# Patient Record
Sex: FEMALE | Race: BLACK OR AFRICAN AMERICAN
[De-identification: names, ages, dates, MRNs, and addresses within clinical notes are randomized per-mention and may not be internally consistent; named-entity substitution may affect disease eponyms.]

---

## 2020-03-14 ENCOUNTER — HOSPITAL ENCOUNTER (OUTPATIENT)
Dept: HOSPITAL 25 - ED | Age: 50
Setting detail: OBSERVATION
LOS: 1 days | Discharge: HOME | End: 2020-03-15
Attending: INTERNAL MEDICINE | Admitting: INTERNAL MEDICINE
Payer: SELF-PAY

## 2020-03-14 DIAGNOSIS — R06.00: ICD-10-CM

## 2020-03-14 DIAGNOSIS — Z87.891: ICD-10-CM

## 2020-03-14 DIAGNOSIS — Z79.3: ICD-10-CM

## 2020-03-14 DIAGNOSIS — D25.9: ICD-10-CM

## 2020-03-14 DIAGNOSIS — D50.9: Primary | ICD-10-CM

## 2020-03-14 DIAGNOSIS — J45.909: ICD-10-CM

## 2020-03-14 DIAGNOSIS — R94.31: ICD-10-CM

## 2020-03-14 LAB
ALBUMIN SERPL BCG-MCNC: 4.2 G/DL (ref 3.2–5.2)
ALBUMIN/GLOB SERPL: 1.4 {RATIO} (ref 1–3)
ALP SERPL-CCNC: 49 U/L (ref 34–104)
ALT SERPL W P-5'-P-CCNC: 8 U/L (ref 7–52)
ANION GAP SERPL CALC-SCNC: 7 MMOL/L (ref 2–11)
AST SERPL-CCNC: 14 U/L (ref 13–39)
BASOPHILS # BLD AUTO: 0.1 10^3/UL (ref 0–0.2)
BUN SERPL-MCNC: 7 MG/DL (ref 6–24)
BUN/CREAT SERPL: 12.3 (ref 8–20)
CALCIUM SERPL-MCNC: 9.2 MG/DL (ref 8.6–10.3)
CHLORIDE SERPL-SCNC: 105 MMOL/L (ref 101–111)
EOSINOPHIL # BLD AUTO: 0 10^3/UL (ref 0–0.6)
FERRITIN SERPL IA-MCNC: 2.5 NG/ML (ref 11–307)
GLOBULIN SER CALC-MCNC: 3 G/DL (ref 2–4)
GLUCOSE SERPL-MCNC: 74 MG/DL (ref 70–100)
HCO3 SERPL-SCNC: 25 MMOL/L (ref 22–32)
HCT VFR BLD AUTO: 21 % (ref 35–47)
HCT VFR BLD AUTO: 23 % (ref 35–47)
HGB BLD-MCNC: 6.1 G/DL (ref 12–16)
INR PPP/BLD: 1.22 (ref 0.82–1.09)
IRON SERPL-MCNC: < 20 UG/DL (ref 50–212)
LYMPHOCYTES # BLD AUTO: 1.4 10^3/UL (ref 1–4.8)
MCH RBC QN AUTO: 14 PG (ref 27–31)
MCHC RBC AUTO-ENTMCNC: 27 G/DL (ref 31–36)
MCV RBC AUTO: 52 FL (ref 80–97)
MICROCYTES BLD QL SMEAR: (no result)
MONOCYTES # BLD AUTO: 0.4 10^3/UL (ref 0–0.8)
NEUTROPHILS # BLD AUTO: 1.4 10^3/UL (ref 1.5–7.7)
NRBC # BLD AUTO: 0 10^3/UL
NRBC BLD QL AUTO: 0.4
PLATELET # BLD AUTO: 248 10^3/UL (ref 150–450)
POTASSIUM SERPL-SCNC: 4 MMOL/L (ref 3.5–5)
PROT SERPL-MCNC: 7.2 G/DL (ref 6.4–8.9)
RBC # BLD AUTO: 4.14 10^6/UL (ref 3.7–4.87)
RBC # BLD AUTO: 4.36 10^6 /UL (ref 3.7–4.87)
RETICULOCYTE PRODUCTION INDEX: 0.3 %
RETICULOCYTE PRODUCTION INDEX: 0.5 % (ref 0.5–1.5)
SODIUM SERPL-SCNC: 137 MMOL/L (ref 135–145)
TIBC SERPL-MCNC: 564 MCG/DL (ref 250–450)
TRANSFERRIN SERPL-MCNC: 403 MG/DL (ref 203–362)
TROPONIN I SERPL-MCNC: 0 NG/ML (ref ?–0.03)
WBC # BLD AUTO: 3.4 10^3/UL (ref 3.5–10.8)

## 2020-03-14 PROCEDURE — 86900 BLOOD TYPING SEROLOGIC ABO: CPT

## 2020-03-14 PROCEDURE — 36415 COLL VENOUS BLD VENIPUNCTURE: CPT

## 2020-03-14 PROCEDURE — 36430 TRANSFUSION BLD/BLD COMPNT: CPT

## 2020-03-14 PROCEDURE — 84484 ASSAY OF TROPONIN QUANT: CPT

## 2020-03-14 PROCEDURE — 83540 ASSAY OF IRON: CPT

## 2020-03-14 PROCEDURE — 83550 IRON BINDING TEST: CPT

## 2020-03-14 PROCEDURE — P9040 RBC LEUKOREDUCED IRRADIATED: HCPCS

## 2020-03-14 PROCEDURE — 76856 US EXAM PELVIC COMPLETE: CPT

## 2020-03-14 PROCEDURE — 80053 COMPREHEN METABOLIC PANEL: CPT

## 2020-03-14 PROCEDURE — 99283 EMERGENCY DEPT VISIT LOW MDM: CPT

## 2020-03-14 PROCEDURE — 86850 RBC ANTIBODY SCREEN: CPT

## 2020-03-14 PROCEDURE — 86922 COMPATIBILITY TEST ANTIGLOB: CPT

## 2020-03-14 PROCEDURE — 86901 BLOOD TYPING SEROLOGIC RH(D): CPT

## 2020-03-14 PROCEDURE — G0378 HOSPITAL OBSERVATION PER HR: HCPCS

## 2020-03-14 PROCEDURE — 85610 PROTHROMBIN TIME: CPT

## 2020-03-14 PROCEDURE — 85045 AUTOMATED RETICULOCYTE COUNT: CPT

## 2020-03-14 PROCEDURE — 82728 ASSAY OF FERRITIN: CPT

## 2020-03-14 PROCEDURE — 85025 COMPLETE CBC W/AUTO DIFF WBC: CPT

## 2020-03-14 PROCEDURE — 93005 ELECTROCARDIOGRAM TRACING: CPT

## 2020-03-14 NOTE — HP
CC:  Dr. Zonia Hess; Dr. Richards *

 

ADMISSION HISTORY AND PHYSICAL:

 

ATE OF ADMISSION:  03/14/20

 

PRIMARY CARE PHYSICIAN:  Dr. Zonia Hess.

 

SOURCE OF INFORMATION:  History obtained from interview with the patient.

 

RELIABILITY:  Good.

 

HEALTHCARE PROXY:  Her son.

 

CODE STATUS:  Full.

 

CHIEF COMPLAINT:  Dyspnea on exertion and sent in for anemia.

 

HISTORY OF PRESENT ILLNESS:  This is a 49-year-old female with minimal past 
medical history including reactive airway disease, who has been in her usual 
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 
she works with housekeeping, she is becoming increasingly dyspneic with 
exertion.  Yesterday, she was referred to have blood work because of her 
worsening dyspnea on exertion, which I believe the referral came from Dr. Richards and basic lab work was notable for a hemoglobin of 5.7.  She was 
referred to the emergency room and repeat was 6.1, severely microcytic with an 
MCV of 52.  She notes additionally with her dyspnea on exertion she 
occasionally has chest discomfort described as a sharp pain in her left breast 
while working.  She has a history of heavy menses for the last 3 years, worse 
over the last year.  Typically, her menses last for about 3 days and she goes 
through approximately 5 pads per day.  She denies any bright red blood per 
rectum.  She has no melena.  Yesterday, she had some nausea, but no vomiting.  
She has otherwise felt well.

 

PAST MEDICAL HISTORY:  Includes reactive airway disease, on no medication.

 

PAST SURGICAL HISTORY:  History of breast reduction.

 

MEDICATIONS:  She is on no medications.

 

ALLERGIES:  She has no allergies.

 

FAMILY HISTORY:  Sister with CAD.  No history of bleeding in her family, 
although she believes her sister may have had sickle cell disease.

 

SOCIAL HISTORY:  Previously smoked cigars for about 5 years, quit 6 years 
prior. Previously drank more heavily 6-pack per day, but has not drank in 6 
years.  She works at [employer] in housekeeping.  She was born in [facility], had 
previously been lived in [place], and has been in [place] over the last 2 
years.

 

REVIEW OF SYSTEMS:  A 14-point review of systems is negative for all systems.

 

                               PHYSICAL EXAMINATION

 

GENERAL:  A well-appearing female, in no apparent distress.

 

VITAL SIGNS:  In the emergency room, 116/71, heart rate 64, respiratory rate of 
16, she is 98% on room air, T-max 98.5.

 

HEENT:  Her oropharynx is clear.  She has moist mucous membranes.  No scleral 
icterus, although mild conjunctival pallor.

 

LUNGS:  Clear to auscultation.

 

HEART:  She has a regular rate and rhythm with no murmurs, rubs, or gallops.

 

ABDOMEN:  Soft, nontender, nondistended.

 

EXTREMITIES:  Warm and well perfused.  Less than 2 second cap refill.  No 
clubbing, cyanosis, or edema.

 

NEUROLOGIC:  She is alert and oriented x3.  Her cranial nerves II through XII 
are intact.

 

 DIAGNOSTIC STUDIES/LAB DATA:  Pertinent labs:  BMP is normal.  Troponin I is 
0.00. White blood cell count is 3.4, hemoglobin is 6.1 with an MCV of 52, 
hematocrit of 23, platelets 248, 2+ hypochromasia, 1+ anisocytosis, and 2+ 
microcytosis.  Her INR is 1.2.

 

EKG:  Normal sinus rhythm with normal limit axis, normal intervals, normal R-
wave progression.  No ST or T-wave changes.

 

ASSESSMENT AND PLAN:  This is a 49-year-old female with newly identified severe 
microcytic anemia in the setting of progressive dyspnea on exertion.

1.  Severe anemia.  Suspect iron-deficiency anemia in the setting of many years 
of heavy menses.  Checking iron studies as well as reticulocyte count prior to 
the administration of blood now.  Transfuse 1 unit of packed red blood cells, 
check after transfusion in the morning.  Plan on iron replacement if iron 
deficient. Additionally, if no evidence of bleeding from any other source, of 
which at this point there is no indication that she has another source, we 
would start oral contraceptive pill on discharge to assist with heavy menstrual 
bleeding. Additionally, we will check transvaginal ultrasound to evaluate for 
fibroids contributing to heavy bleeding.

2.  Chest discomfort.  She has had typical angina in the course of her work; 
however, particularly in the setting of severe anemia, I do think she will 
warrant outpatient stress test on a nonemergent basis, which could be ordered 
prior to her discharge.

3.  Bronchial reactive airway disease, on no medications and stable.

4.  DVT prophylaxis:  Low risk as well as anemic.  We will place with HONG 
stockings, ambulate ad nighat.

 

 

 

[id number] #: [id number]

[facility]

## 2020-03-14 NOTE — XMS REPORT
Continuity of Care Document (CCD)

 Created on:2020



Patient:Gin Canchola

Sex:Female

:1970

External Reference #:[id number]





Demographics







 Address  [address]



   [place] [address]

 

 Mobile Phone  [phone]

 

 Email Address  [email or web address]

 

 Preferred Language  en

 

 Marital Status  Not  or 

 

 Yazdanism Affiliation  Unknown

 

 Race  White

 

 Ethnic Group  Not  or 









Author







 Name  Zonia Hess MD

 

 Address  [address]



   Unavailable



   [place] [address]









Care Team Providers







 Name  Role  Phone

 

 Zonia Hess MD - Internal Medicine  Care Team Information   [phone]
[phone]









Problems







 Description

 

 No Information Available







Social History







 Type  Date  Description  Comments

 

 Birth Sex    Unknown  

 

 ETOH Use    Rarely consumes alcohol  

 

 Tobacco Use  Start: Unknown End:  Patient is a former  Smoked 5 cigars



   Unknown  smoker  daily from [age]



       [age]

 

 Recreational Drug Use    Denies Drug Use  

 

 Smoking Status  Reviewed: 20  Patient is a former  Smoked 5 cigars



     smoker  daily from [age]



       [age]

 

 Exercise Type/Frequency    Does not exercise  







Allergies, Adverse Reactions, Alerts







 Description

 

 No Known Drug Allergies







Medications







 Active Medications  SIG  Qnty  Indications  Ordering Provider  Date

 

 Asmanex HFA  1 puff twice a  2units    Tonya  2020



   day      KENDY Varner  



 100mcg/Act Aerosol          



           

 

 Albuterol Sulfate  use 1-2 puffs  18Inhaler    Eri Richards,  2020



 HFA  every 4-6 hours      MD  



    108(90Base)  as needed        



 mcg/Act Aerosol          



           









 History Medications









 Flovent HFA  1 puff twice daily  1units  J45.909  Tonya  2020 -



         Telly, NP  2020



 110mcg/Act          



 Aerosol          



           

 

 Ventolin HFA  1 to 2 inhalations  8gm  J45.909  Tonya  2020 -



   every 4 to 6 hours      KENDY Varner  2020



 108(90Base)  as needed        



 mcg/Act Aerosol          



           

 

 Flovent HFA  Inhale 1 puff By  12Inhaler    Tonya  2020 -



   Mouth Twice A Day      KENDY Varner  2020



 110mcg/Act          



 Aerosol          



           







Immunizations







 CPT Code  Status  Date  Vaccine  Reaction  Lot #

 

 [id number]  Given  2019  Tdap -  No immediate reaction  525np



       Tetanus/Diptheria/Acellular    



       Pertussis    







Vital Signs







 Date  Vital  Result  Comment

 

 2020  2:49pm  Height  63.5 inches  5'3.50"









 Weight  155.00 lb  

 

 Heart Rate  66 /min  

 

 BP Systolic Sitting  118 mmHg  

 

 BP Diastolic Sitting  65 mmHg  

 

 Body Temperature  97.8 F  

 

 O2 % BldC Oximetry  99 %  

 

 BMI (Body Mass Index)  27.0 kg/m2  









 2020  8:11am  Height  63.5 inches  5'3.50"









 Weight  135.00 lb  

 

 Heart Rate  72 /min  

 

 BP Systolic  110 mmHg  

 

 BP Diastolic  70 mmHg  

 

 O2 % BldC Oximetry  98 %  

 

 BMI (Body Mass Index)  23.5 kg/m2  







Results







 Description

 

 No Information Available







Procedures







 Date  Code  Description  Status

 

 2020  13146  EKG Tracing & Interpretation  Completed

 

 2020  25632  Bronchospasm Provocation Evalu  Completed

 

 2020  18533  Diffusing Capacity  Completed

 

 2020  69907  Plethysmography Determination Lung Volumes & Per Airway  
Completed



     Resist  

 

 2020  98957  Pulmonary Function><Bronchodil  Completed

 

 2019  14239  ECHO Stress Test Incl Perf Contiuous ekg Monitoring  
Completed



     W/Phys Superv  

 

 2019  53013842  Mammogram  Completed







Medical Devices







 Description

 

 No Information Available







Encounters







 Type  Date  Location  Provider  Dx  Diagnosis

 

 Office Visit  2020  [facility]  Tonya  J45.909  Unspecified 
asthma,



   8:00a  [facility]  KENDY Varner    uncomplicated



     Cma      









 I27.20  Pulmonary hypertension, unspecified

 

 R06.83  Snoring









 Office Visit  2019  9:15a  [facility]  Eri  R06.02  Shortness of



     [facility]  MD Maurice    breath



     Cma      









 R06.83  Snoring

 

 I27.20  Pulmonary hypertension, unspecified

 

 K21.9  Gastro-esophageal reflux disease without esophagitis









 Office Visit  2019  3:40p  [facility]  Qutaybeh S.  R00.2  
Palpitations



       ALONDRA Estes    









 I34.0  Nonrheumatic mitral (valve) insufficiency

 

 I27.20  Pulmonary hypertension, unspecified







Assessments







 Date  Code  Description  Provider

 

 2020  R07.89  Other chest pain  Zonia Hess MD

 

 2020  J45.909  Unspecified asthma, uncomplicated  Tonya Varner, KENDY

 

 2020  I27.20  Pulmonary hypertension, unspecified  Tonya Varner NP

 

 2020  R06.83  Snoring  Tonya Varner, KENDY

 

 2020  J45.909  Unspecified asthma, uncomplicated  Eri Richards MD

 

 2020  R06.02  Shortness of breath  Eri Richards MD

 

 2019  R06.02  Shortness of breath  Eri Richards MD

 

 2019  R06.83  Snoring  Eri Richards MD

 

 2019  I27.20  Pulmonary hypertension, unspecified  Eri Richards MD

 

 2019  K21.9  Gastro-esophageal reflux disease  Eri Richards MD



     without esophagitis  

 

 2019  R00.2  Palpitations  Qutaybeh S. Maghaydah, M.D.

 

 2019  I34.0  Nonrheumatic mitral (valve)  Qutaybeh S. Maghaydah, M.D.



     insufficiency  

 

 2019  I27.20  Pulmonary hypertension, unspecified  Qutaybeh S. Maghaydah
, M.D.

 

 2019  R00.2  Palpitations  Qutaybeh S. Maghaydah, M.D.

 

 2019  R06.02  Shortness of breath  Qutaybeh S. Maghaydah, M.D.

 

 2019  R00.0  Tachycardia, unspecified Qutaybeh S. Maghaydah, M.D.







Plan of Treatment

Future Appointment(s):2020 10:00 am - Tonya Varner NP at 
[facility] - Zonia Hess, MDR07.89 Other 
chest painComments:We will do some blood work to see if this will help us 
figure out why you are having pain. Your EKG looks great! Continue to use your 
puffer when you have this pain for the next 2-3 weeks.



Functional Status







 Description

 

 No Information Available







Mental Status







 Description

 

 No Information Available







Referrals







 Refer to Dr  Reason for Referral  Status  Appt Date

 

 Eri Richards MD  sob, h/o smoking/ PHTN  Sent  2019









 [address]

 

 [address]

 

 [place] [address] [phone]

## 2020-03-14 NOTE — ED
HPI Chest Pain





- HPI Summary


HPI Summary: 


49 year old female presents to the ED with a chief complaint of chest pain 

starting yesterday. Patient saw her PCP after having chest pain yesterday, and 

blood panel showed that she was anemic and told her [place] [facility] Patient 

also reports slight shortness of breath. Patient has no PMHx. No FHx. 








- History of Current Complaint


Chief Complaint: EDChestPainROMI


Time Seen by Provider: 03/14/20 16:07


Hx Obtained From: Patient


Onset/Duration: Started Days Ago


Timing: Constant


Initial Severity: Moderate


Current Severity: Moderate


Pain Intensity: 7


Pain Scale Used: 0-10 Numeric


Chest Pain Location: Diffuse


Chest Pain Radiates: No


Character: Tightness


Aggravating Factor(s): Nothing


Alleviating Factor(s): Nothing


Associated Signs and Symptoms: Positive: Chest Pain, Shortness of Breath





- Allergy/Home Medications


Allergies/Adverse Reactions: 


 Allergies











Allergy/AdvReac Type Severity Reaction Status Date / Time


 


No Known Allergies Allergy   Verified 03/14/20 14:46














PMH/Surg Hx/FS Hx/Imm Hx


Previously Healthy: Yes


Endocrine/Hematology History: Reports: Hx Anemia


Sensory History: 


   Denies: Hx Legally Blind


Infectious Disease History: No


Infectious Disease History: 


   Denies: Traveled Outside the [place] in Last 30 Days





- Family History


Known Family History: Positive: None





- Social History


Alcohol Use: None


Hx Substance Use: No


Substance Use Type: Reports: None


Hx Tobacco Use: No





Review of Systems


Positive: Chest Pain


Positive: Shortness Of Breath


All Other Systems Reviewed And Are Negative: Yes





Physical Exam





- Summary


Physical Exam Summary: 





Appearance: The patient is well-nourished in no acute distress and in no acute 

pain.





Skin: The skin is warm and dry, and skin color reflects adequate perfusion.





HEENT: The head is normocephalic and atraumatic. The pupils are equal and 

reactive. The conjunctivae are clear and without drainage. Nares are patent and 

without drainage. Mouth reveals moist mucous membranes, and the throat is 

without erythema and exudate. The external ears are intact. The ear canals are 

patent and without drainage. The tympanic membranes are intact.





Neck: The neck is supple with full range of motion and non-tender. There are no 

carotid bruits. There is no neck vein distension.





Respiratory: Chest is non-tender. Lungs are clear to auscultation and breath 

sounds are symmetrical and equal.





Cardiovascular: Heart is regular rate and rhythm. There is no murmur or rub 

auscultated. There is no peripheral edema and pulses are symmetrical and equal.





Abdomen: The abdomen is soft and non-tender. There are normal bowel sounds 

heard in all four quadrants and there is no organomegaly palpated.





Musculoskeletal: There is no back tenderness noted. Extremities are non-tender 

with full range of motion. There is good capillary refill. There is no 

peripheral edema or calf tenderness elicited.





Neurological: Patient is alert and oriented to person, place and time. The 

patient has symmetrical motor strength in all four extremities. Cranial nerves 

are grossly intact. Deep tendon reflexes are symmetrical and equal in all four 

extremities.





Psychiatric: The patient has an appropriate affect and does not exhibit any 

anxiety or depression.


Triage Information Reviewed: Yes


Vital Signs On Initial Exam: 


 Initial Vitals











Temp Pulse Resp BP Pulse Ox


 


 98.5 F   64   16   116/71   98 


 


 03/14/20 14:41  03/14/20 14:41  03/14/20 14:41  03/14/20 14:41  03/14/20 14:41











Vital Signs Reviewed: Yes





Procedures





- Sedation


Patient Received Moderate/Deep Sedation with Procedure: No





Diagnostics





- Vital Signs


 Vital Signs











  Temp Pulse Resp BP Pulse Ox


 


 03/14/20 14:41  98.5 F  64  16  116/71  98














- Laboratory


Lab Results: 


 Lab Results











  03/14/20 03/14/20 03/14/20 Range/Units





  14:55 14:55 14:55 


 


WBC  3.4 L    (3.5-10.8)  10^3/uL


 


RBC  4.36    (3.70-4.87)  10^6 /uL


 


Hgb  6.1 L*    (12.0-16.0)  g/dL


 


Hct  23 L    (35-47)  %


 


MCV  52 L    (80-97)  fL


 


MCH  14 L    (27-31)  pg


 


MCHC  27 L    (31-36)  g/dL


 


RDW  23 H    (10-15)  %


 


Plt Count  248    (150-450)  10^3/uL


 


MPV  8.8    (7.4-10.4)  fL


 


Neut % (Auto)  Pending    


 


Lymph % (Auto)  Pending    


 


Mono % (Auto)  Pending    


 


Eos % (Auto)  Pending    


 


Baso % (Auto)  Pending    


 


Absolute Neuts (auto)  Pending    


 


Absolute Lymphs (auto)  Pending    


 


Absolute Monos (auto)  Pending    


 


Absolute Eos (auto)  Pending    


 


Absolute Basos (auto)  Pending    


 


Absolute Nucleated RBC  Pending    


 


Nucleated RBC %  Pending    


 


Hypochromasia  2+    


 


Anisocytosis  1+    


 


Microcytosis  2+    


 


INR (Anticoag Therapy)   1.22 H   (0.82-1.09)  


 


Sodium    137  (135-145)  mmol/L


 


Potassium    4.0  (3.5-5.0)  mmol/L


 


Chloride    105  (101-111)  mmol/L


 


Carbon Dioxide    25  (22-32)  mmol/L


 


Anion Gap    7  (2-11)  mmol/L


 


BUN    7  (6-24)  mg/dL


 


Creatinine    0.57  (0.51-0.95)  mg/dL


 


Est GFR ( Amer)    137.6  (>60)  


 


Est GFR (Non-Af Amer)    113.7  (>60)  


 


BUN/Creatinine Ratio    12.3  (8-20)  


 


Glucose    74  ()  mg/dL


 


Calcium    9.2  (8.6-10.3)  mg/dL


 


Total Bilirubin    0.90  (0.2-1.0)  mg/dL


 


AST    14  (13-39)  U/L


 


ALT    8  (7-52)  U/L


 


Alkaline Phosphatase    49  ()  U/L


 


Troponin I    0.00  (<0.03)  ng/mL


 


Total Protein    7.2  (6.4-8.9)  g/dL


 


Albumin    4.2  (3.2-5.2)  g/dL


 


Globulin    3.0  (2-4)  g/dL


 


Albumin/Globulin Ratio    1.4  (1-3)  


 


Blood Type     


 


Antibody Screen     














  03/14/20 Range/Units





  15:15 


 


WBC   (3.5-10.8)  10^3/uL


 


RBC   (3.70-4.87)  10^6 /uL


 


Hgb   (12.0-16.0)  g/dL


 


Hct   (35-47)  %


 


MCV   (80-97)  fL


 


MCH   (27-31)  pg


 


MCHC   (31-36)  g/dL


 


RDW   (10-15)  %


 


Plt Count   (150-450)  10^3/uL


 


MPV   (7.4-10.4)  fL


 


Neut % (Auto)   


 


Lymph % (Auto)   


 


Mono % (Auto)   


 


Eos % (Auto)   


 


Baso % (Auto)   


 


Absolute Neuts (auto)   


 


Absolute Lymphs (auto)   


 


Absolute Monos (auto)   


 


Absolute Eos (auto)   


 


Absolute Basos (auto)   


 


Absolute Nucleated RBC   


 


Nucleated RBC %   


 


Hypochromasia   


 


Anisocytosis   


 


Microcytosis   


 


INR (Anticoag Therapy)   (0.82-1.09)  


 


Sodium   (135-145)  mmol/L


 


Potassium   (3.5-5.0)  mmol/L


 


Chloride   (101-111)  mmol/L


 


Carbon Dioxide   (22-32)  mmol/L


 


Anion Gap   (2-11)  mmol/L


 


BUN   (6-24)  mg/dL


 


Creatinine   (0.51-0.95)  mg/dL


 


Est GFR ( Amer)   (>60)  


 


Est GFR (Non-Af Amer)   (>60)  


 


BUN/Creatinine Ratio   (8-20)  


 


Glucose   ()  mg/dL


 


Calcium   (8.6-10.3)  mg/dL


 


Total Bilirubin   (0.2-1.0)  mg/dL


 


AST   (13-39)  U/L


 


ALT   (7-52)  U/L


 


Alkaline Phosphatase   ()  U/L


 


Troponin I   (<0.03)  ng/mL


 


Total Protein   (6.4-8.9)  g/dL


 


Albumin   (3.2-5.2)  g/dL


 


Globulin   (2-4)  g/dL


 


Albumin/Globulin Ratio   (1-3)  


 


Blood Type  A Positive  


 


Antibody Screen  Pending  











Result Diagrams: 


 03/14/20 14:55





 03/14/20 14:55


Lab Statement: Any lab studies that have been ordered have been reviewed, and 

results considered in the medical decision making process.





- EKG


  ** 1449


Cardiac Rate: NL - 60 bpm


EKG Rhythm: Sinus Rhythm


Summary of EKG Findings: Normal sinus rhythm, normal ST, no ectopy, no STEMI. 

Dr. Sandoval has interpreted and reviewed this EKG.





Chest Pain Course/Dx





- Course


Course Of Treatment: Ms. Lopez presented complaining of chest pain.  Her EKG 

was fine and she was nontoxic in appearance with stable vitals.  She was found 

be quite anemic and she was typed and crossed.  Hospitalist was contacted for 

admission and transfusion as well as workup for her microcytic anemia.





- Diagnoses


Provider Diagnoses: 


 Severe anemia, Chest pain





Is Visit Pregnancy Related: No





- Provider Notifications


Discussed Care Of Patient With: Laura Alatorre - Hospitalist


Time Discussed With Above Provider: 19:07


Instructed by Provider To: Admit As Observation - I talked to Dr. Alatorre, who 

accepted patient for admission as observation.


Admit/Transition Orders Completed By ED Provider: Yes





Discharge ED





- Sign-Out/Discharge


Documenting (check all that apply): Patient Departure - admit


All imaging exams completed and their final reports reviewed: Yes





- Discharge Plan


Condition: Stable


Disposition: ADMITTED TO [facility]





- Billing Disposition and Condition


Condition: STABLE


Disposition: Admitted to [facility]





- Attestation Statements


Document Initiated by Scribe: Yes


Documenting Scribe: Pro Goldman


Provider For Whom Blanche is Documenting (Include Credential): Dr. Fabricio Sandoval


Scribe Attestation: 


I, Pro Goldman, scribed for Dr. Fabricio Sandoval on 03/14/20 at 2135. 


Scribe Documentation Reviewed: Yes


Provider Attestation: 


The documentation as recorded by the scribe, Pro Goldman accurately 

reflects the service I personally performed and the decisions made by me, Dr. Fabricio Sandoval


Status of Scribe Document: Viewed

## 2020-03-15 VITALS — DIASTOLIC BLOOD PRESSURE: 72 MMHG | SYSTOLIC BLOOD PRESSURE: 117 MMHG

## 2020-03-15 LAB
BASOPHILS # BLD AUTO: 0.1 10^3/UL (ref 0–0.2)
EOSINOPHIL # BLD AUTO: 0.2 10^3/UL (ref 0–0.6)
HCT VFR BLD AUTO: 29 % (ref 35–47)
HGB BLD-MCNC: 8.3 G/DL (ref 12–16)
LYMPHOCYTES # BLD AUTO: 2 10^3/UL (ref 1–4.8)
MCH RBC QN AUTO: 16 PG (ref 27–31)
MCHC RBC AUTO-ENTMCNC: 28 G/DL (ref 31–36)
MCV RBC AUTO: 57 FL (ref 80–97)
MICROCYTES BLD QL SMEAR: (no result)
MONOCYTES # BLD AUTO: 0.4 10^3/UL (ref 0–0.8)
NEUTROPHILS # BLD AUTO: 1.5 10^3/UL (ref 1.5–7.7)
NRBC # BLD AUTO: 0 10^3/UL
NRBC BLD QL AUTO: 0.1
PLATELET # BLD AUTO: 196 10^3/UL (ref 150–450)
POLYCHROMASIA BLD QL SMEAR: (no result)
RBC # BLD AUTO: 5.08 10^6 /UL (ref 3.7–4.87)
WBC # BLD AUTO: 4.1 10^3/UL (ref 3.5–10.8)

## 2020-03-15 NOTE — DS
CC:  Dr. Zonia Hess *

 

DISCHARGE SUMMARY:

 

DATE OF ADMISSION:  03/14/20

 

DATE OF DISCHARGE:  03/15/20

 

PRIMARY CARE PROVIDER:  Dr. Zonia Hess.

 

ATTENDING PHYSICIAN:  Silvino Alatorre MD * (DICTATED BY ZEE RHODES NP)

 

PRIMARY DIAGNOSIS:  Blood loss and iron deficiency anemia secondary to uterine 
fibroids.

 

SECONDARY DIAGNOSIS:  Asthma.

 

STUDIES WHILE IN THE HOSPITAL:  EKG on 03/14/20 showed normal sinus rhythm with 
a rate of 60, no acute changes.  Transvaginal ultrasound on 03/14/20 revealed 
enlarged fibroid uterus, the largest fibroid measuring 5 x 5 cm, which could be 
partially submucosal.  No adnexal pathology.

 

HISTORY OF PRESENT ILLNESS AND HOSPITAL COURSE:  Ms. Duran Lopez is a 49-year-
old female with past medical history of asthma who presented to the emergency 
room on 03/14/20 with complaints of dyspnea on exertion.  Please see the 
history and physical by Dr. Alatorre for a complete summary of the events leading 
up to this hospitalization.  In short, the patient reported increasing dyspnea 
on exertion over the last 2 months.  She went to see her pulmonologist, Dr. Richards where she had lab work and was noted to have a hemoglobin of 5.7, so she 
was sent to the emergency room.  In the emergency room, she was noted to have 
an H and H of 6.1 and 23.  For that reason, she was admitted by the hospitalist 
service.

 

The patient was transfused 2 units of packed red blood cells as of this morning 
H and H has responded well and is 8.3 and 29.  Iron studies did reveal profound 
iron deficiency with a ferritin of 2.5.  She did have transvaginal ultrasound 
that did reveal uterine fibroids and so this is suspected to be the cause of 
her anemia. She did receive 1 dose of IV iron sucrose today in the hospital and 
she is agreeable to appropriate outpatient followup.  This morning, she feels 
significantly better and is anxious for discharge.

 

PHYSICAL EXAMINATION:  On exam, she is alert and oriented x4 with no focal 
neurological deficits.  Her heart has a regular rate and rhythm without murmurs
, rubs, or gallops.  Lungs are clear to auscultation without rhonchi, wheezes, 
or rubs.  There is no edema.  Physical exam is otherwise benign.

 

DISCHARGE MEDICATIONS: New:

1.  Levonorgestrel-Ethinyl estradiol 1 tab p.o. daily.

2.  Ferrous sulfate 325 mg p.o. daily.

 

DISCHARGE PLAN:  Ms. Duran Lopez will be discharged home.  Activity will be as 
tolerated.  Diet will be regular as tolerated.  Medications are noted above.  
The patient has been started on an oral contraceptive to help reduce 
menorrhagia.  Again, she did receive 1 dose of IV iron while here in the 
hospital and she should continue daily iron supplementation.  I have sent 
prescriptions in for both of these medications.  There was some concern when 
she came in that she had some chest pain associated with her shortness of breath
, though the patient was noted to have a stress test back in September 2019, so 
repeat stress test was not necessary at this time.  She is agreeable to follow 
up with her PCP and she will need to be seen by an outpatient gynecologist to 
ensure reduction in bleeding.  I did provide the patient with a work note, 
which indicates that she can return to work in 3 days. She should see her PCP 
in the next 4 to 7 days and she reports that she will call their office in the 
morning.  She should return to the emergency room or nearest hospital for any 
worsening of symptoms, shortness of breath, lightheadedness, dizziness, chest 
discomfort, high fevers, chills, night sweats, loss of consciousness, or any 
other worrisome signs or symptoms.

 

DISCHARGE CONDITION:  Stable.

 

DISCHARGE DISPOSITION:  Home.

 

This is a summarized report of a complex medical history and hospital stay.  
For further details, please see the entire medical record.

 

TIME SPENT:  Approximately 40 minutes were spent on this discharge.

 

____________________________________ ZEE RHODES, NP

 

[id number]/CPS #: [id number]

KRISTAL

## 2020-03-29 ENCOUNTER — HOSPITAL ENCOUNTER (EMERGENCY)
Dept: HOSPITAL 25 - ED | Age: 50
LOS: 1 days | Discharge: ANOTHER HOSPITAL | End: 2020-03-30
Payer: SELF-PAY

## 2020-03-29 DIAGNOSIS — Z87.891: ICD-10-CM

## 2020-03-29 DIAGNOSIS — Z79.3: ICD-10-CM

## 2020-03-29 DIAGNOSIS — R53.1: ICD-10-CM

## 2020-03-29 DIAGNOSIS — N17.9: ICD-10-CM

## 2020-03-29 DIAGNOSIS — N93.9: Primary | ICD-10-CM

## 2020-03-29 DIAGNOSIS — R06.02: ICD-10-CM

## 2020-03-29 DIAGNOSIS — R11.2: ICD-10-CM

## 2020-03-29 DIAGNOSIS — J45.909: ICD-10-CM

## 2020-03-29 LAB
ALBUMIN SERPL BCG-MCNC: 3.8 G/DL (ref 3.2–5.2)
ALBUMIN/GLOB SERPL: 1 {RATIO} (ref 1–3)
ALP SERPL-CCNC: 43 U/L (ref 34–104)
ALT SERPL W P-5'-P-CCNC: 13 U/L (ref 7–52)
ANION GAP SERPL CALC-SCNC: 10 MMOL/L (ref 2–11)
ANION GAP SERPL CALC-SCNC: 11 MMOL/L (ref 2–11)
ANION GAP SERPL CALC-SCNC: 13 MMOL/L (ref 2–11)
AST SERPL-CCNC: 54 U/L (ref 13–39)
BUN SERPL-MCNC: 75 MG/DL (ref 6–24)
BUN SERPL-MCNC: 77 MG/DL (ref 6–24)
BUN SERPL-MCNC: 77 MG/DL (ref 6–24)
BUN/CREAT SERPL: 10.1 (ref 8–20)
BUN/CREAT SERPL: 10.2 (ref 8–20)
BUN/CREAT SERPL: 9.6 (ref 8–20)
CALCIUM SERPL-MCNC: 8.1 MG/DL (ref 8.6–10.3)
CALCIUM SERPL-MCNC: 8.7 MG/DL (ref 8.6–10.3)
CALCIUM SERPL-MCNC: 9.5 MG/DL (ref 8.6–10.3)
CHLORIDE SERPL-SCNC: 103 MMOL/L (ref 101–111)
CHLORIDE SERPL-SCNC: 96 MMOL/L (ref 101–111)
CHLORIDE SERPL-SCNC: 98 MMOL/L (ref 101–111)
CK SERPL-CCNC: 50 U/L (ref 10–223)
FERRITIN SERPL IA-MCNC: 644.1 NG/ML (ref 11–307)
GLOBULIN SER CALC-MCNC: 3.9 G/DL (ref 2–4)
GLUCOSE SERPL-MCNC: 103 MG/DL (ref 70–100)
GLUCOSE SERPL-MCNC: 115 MG/DL (ref 70–100)
GLUCOSE SERPL-MCNC: 94 MG/DL (ref 70–100)
HCG SERPL QL: < 0.6 MIU/ML
HCO3 SERPL-SCNC: 19 MMOL/L (ref 22–32)
HCO3 SERPL-SCNC: 22 MMOL/L (ref 22–32)
HCO3 SERPL-SCNC: 22 MMOL/L (ref 22–32)
HCT VFR BLD AUTO: 27 % (ref 35–47)
HGB BLD-MCNC: 7.9 G/DL (ref 12–16)
LDH SERPL L TO P-CCNC: 1168 U/L (ref 140–271)
MCH RBC QN AUTO: 16 PG (ref 27–31)
MCHC RBC AUTO-ENTMCNC: 29 G/DL (ref 31–36)
MCV RBC AUTO: 55 FL (ref 80–97)
MICROCYTES BLD QL SMEAR: (no result)
NRBC # BLD AUTO: 0 10^3/UL
NRBC BLD QL AUTO: 0.1
PLATELET # BLD AUTO: 597 10^3/UL (ref 150–450)
POTASSIUM SERPL-SCNC: 4.8 MMOL/L (ref 3.5–5)
POTASSIUM SERPL-SCNC: 4.9 MMOL/L (ref 3.5–5)
POTASSIUM SERPL-SCNC: 4.9 MMOL/L (ref 3.5–5)
PROT SERPL-MCNC: 7.7 G/DL (ref 6.4–8.9)
RBC # BLD AUTO: 4.97 10^6 /UL (ref 3.7–4.87)
RBC UR QL AUTO: (no result)
SCHISTOCYTES BLD QL AUTO: (no result)
SODIUM SERPL-SCNC: 131 MMOL/L (ref 135–145)
SODIUM SERPL-SCNC: 131 MMOL/L (ref 135–145)
SODIUM SERPL-SCNC: 132 MMOL/L (ref 135–145)
TROPONIN I SERPL-MCNC: 0 NG/ML (ref ?–0.03)
URATE SERPL-MCNC: 9.3 MG/DL (ref 2.3–6.6)
WBC # BLD AUTO: 7.9 10^3/UL (ref 3.5–10.8)
WBC UR QL AUTO: (no result)

## 2020-03-29 PROCEDURE — 85060 BLOOD SMEAR INTERPRETATION: CPT

## 2020-03-29 PROCEDURE — 82550 ASSAY OF CK (CPK): CPT

## 2020-03-29 PROCEDURE — 84484 ASSAY OF TROPONIN QUANT: CPT

## 2020-03-29 PROCEDURE — 85025 COMPLETE CBC W/AUTO DIFF WBC: CPT

## 2020-03-29 PROCEDURE — 96361 HYDRATE IV INFUSION ADD-ON: CPT

## 2020-03-29 PROCEDURE — 86850 RBC ANTIBODY SCREEN: CPT

## 2020-03-29 PROCEDURE — 84702 CHORIONIC GONADOTROPIN TEST: CPT

## 2020-03-29 PROCEDURE — 76775 US EXAM ABDO BACK WALL LIM: CPT

## 2020-03-29 PROCEDURE — 80048 BASIC METABOLIC PNL TOTAL CA: CPT

## 2020-03-29 PROCEDURE — 82803 BLOOD GASES ANY COMBINATION: CPT

## 2020-03-29 PROCEDURE — 96375 TX/PRO/DX INJ NEW DRUG ADDON: CPT

## 2020-03-29 PROCEDURE — 87086 URINE CULTURE/COLONY COUNT: CPT

## 2020-03-29 PROCEDURE — 36415 COLL VENOUS BLD VENIPUNCTURE: CPT

## 2020-03-29 PROCEDURE — 82728 ASSAY OF FERRITIN: CPT

## 2020-03-29 PROCEDURE — 80053 COMPREHEN METABOLIC PANEL: CPT

## 2020-03-29 PROCEDURE — 81015 MICROSCOPIC EXAM OF URINE: CPT

## 2020-03-29 PROCEDURE — 83615 LACTATE (LD) (LDH) ENZYME: CPT

## 2020-03-29 PROCEDURE — 84550 ASSAY OF BLOOD/URIC ACID: CPT

## 2020-03-29 PROCEDURE — 81003 URINALYSIS AUTO W/O SCOPE: CPT

## 2020-03-29 PROCEDURE — 84300 ASSAY OF URINE SODIUM: CPT

## 2020-03-29 PROCEDURE — 93005 ELECTROCARDIOGRAM TRACING: CPT

## 2020-03-29 PROCEDURE — 96374 THER/PROPH/DIAG INJ IV PUSH: CPT

## 2020-03-29 PROCEDURE — 86901 BLOOD TYPING SEROLOGIC RH(D): CPT

## 2020-03-29 PROCEDURE — 86140 C-REACTIVE PROTEIN: CPT

## 2020-03-29 PROCEDURE — 86900 BLOOD TYPING SEROLOGIC ABO: CPT

## 2020-03-29 PROCEDURE — 99285 EMERGENCY DEPT VISIT HI MDM: CPT

## 2020-03-29 NOTE — ED
GI/ HPI





- HPI Summary


HPI Summary: 


49 year old female with history of asthma presents with heavy vaginal bleeding 

for past 3 days.  She admits to abdominal pain and shortness breath for the 

past 3 days.  She states that she feeling very weak and dizzy.  She states 

feels very dizzy when she stands up.  she also admits to palpitations.  She had 

an in office uterine biopsy done on Friday by dr oshea.  States develop 

vaginal bleeding afterwards. States she has been soaking a pad an hour. States 

this is her normal time for period and her periods are always heavy and last 

many days. She was told to take ibuprofen 200 mg every 4 hours for 1 day.  

States that she has took a couple doses on Friday and stopped because it causes 

abdominal pain. States that she was admits here on 3/14 and given 2 liters of 

blood and told to start iron and birth control which she has not been taking.   

She denies any fevers.  She denies any chest pain.  She admits occasional 

cough.  no recent travel or exposure to anyone with known covid. no sore 

throat.  Has a history of asthma.  She has been using her Asmanex as a rescue 

inhaler.  She states that she has not been urinating much.  She states she only 

urinates once or twice a day.  She denies any dysuria.  She states she did have 

some nausea and vomiting and has not been eating much due to the the nausea.   














- History of Current Complaint


Chief Complaint: EDVaginalBleeding


Time Seen by Provider: 03/29/20 18:11


Stated Complaint: WEAKNESS PER PT


Pain Intensity: 9





- Additional Pertinent History


Primary Care Physician: ZOE





- Allergy/Home Medications


Allergies/Adverse Reactions: 


 Allergies











Allergy/AdvReac Type Severity Reaction Status Date / Time


 


No Known Allergies Allergy   Verified 03/29/20 18:07











Home Medications: 


 Home Medications





Ferrous Sulfate TAB* 325 mg PO DAILY #30 tab 03/15/20 [Rx Confirmed 03/29/20]


Levonorgestrel-Ethin Estradiol [Levora-28 Tablet] 1 each PO DAILY #1 packet 03/

15/20 [Rx Confirmed 03/29/20]











PMH/Surg Hx/FS Hx/Imm Hx


Endocrine/Hematology History: Reports: Hx Anemia


Sensory History: Reports: Hx Contacts or Glasses


   Denies: Hx Legally Blind, Hx Hearing Aid


Opthamlomology History: Reports: Hx Contacts or Glasses


   Denies: Hx Legally Blind


Infectious Disease History: No


Infectious Disease History: 


   Denies: Traveled Outside the [place] in Last 30 Days





- Family History


Known Family History: Positive: None





- Social History


Alcohol Use: None


Hx Substance Use: No


Substance Use Type: Reports: None


Hx Tobacco Use: No


Smoking Status (MU): Former Smoker





Review of Systems


Negative: Fever


Negative: Chest Pain


Positive: Shortness Of Breath


Positive: Abdominal Pain, Vomiting, Nausea


Positive: other - vaginal bleeding


Positive: Weakness


All Other Systems Reviewed And Are Negative: Yes





Physical Exam


Triage Information Reviewed: Yes


Vital Signs On Initial Exam: 


 Initial Vitals











Temp Pulse Resp BP Pulse Ox


 


 100.2 F   89   12   100/63   98 


 


 03/29/20 18:02  03/29/20 18:02  03/29/20 18:02  03/29/20 18:02  03/29/20 18:02











Vital Signs Reviewed: Yes


Appearance: Positive: Well-Appearing


Skin: Positive: Warm, Dry


Head/Face: Positive: Normal Head/Face Inspection


Eyes: Positive: Normal


ENT: Positive: Pharynx normal, TMs normal, Other - dry mucous membranes


Respiratory/Lung Sounds: Positive: Clear to Auscultation, Breath Sounds Present


Cardiovascular: Positive: Normal, RRR


Abdomen Description: Positive: Soft, Other: - tenderness in lower abd.  Negative

: CVA Tenderness (R), CVA Tenderness (L)


Bowel Sounds: Positive: Present


Musculoskeletal: Positive: Normal, Other - tenderness in lower back


Neurological: Positive: Normal


Psychiatric: Positive: Normal





Procedures





- Sedation


Patient Received Moderate/Deep Sedation with Procedure: No





Diagnostics





- Vital Signs


 Vital Signs











  Temp Pulse Resp BP Pulse Ox


 


 03/29/20 18:02  100.2 F  89  12  100/63  98














- Laboratory


Result Diagrams: 


 03/29/20 18:26





 03/29/20 22:41


Lab Statement: Any lab studies that have been ordered have been reviewed, and 

results considered in the medical decision making process.





- Ultrasound


  ** renal


Ultrasound Interpretation Completed By: Radiologist


Summary of Ultrasound Findings: Bilaterally enlarged echogenic kidneys. 

Differential includes cardiac congestion/failure or infiltrative process. 

Clinical correlation.





- EKG


  ** No standard instances


Cardiac Rate: NL


EKG Rhythm: Sinus Rhythm


Summary of EKG Findings: sinus rhythm





Re-Evaluation





- Re-Evaluation


  ** First Eval


Re-Evaluation Time: 21:00


Comment: pain better, gave toradol before found out renal function





  ** Second Eval


Re-Evaluation Time: 23:28


Change: Unchanged


Comment: does not feel any better after fluids





  ** Third Eval


Re-Evaluation Time: 23:45


Comment: no sob, complaining of lower back pain.





GIGU Course/Dx





- Course


Course Of Treatment: 49 year old female with history of asthma presents with 

heavy vaginal bleeding for past 3 days.  Has had abdominal pain and shortness 

breath, palpitations, weakness and dizzy.  She had uterine biopsy done on 

Friday by dr oshea.  Told to take ibuprofen 200 mg every 4 hours for 1 day 

which only took a couple doses. has not been taking other medications. She 

states that she has not been urinating much.  She states she did have some 

nausea and vomiting and has not been eating much but due to the the nausea.  

States that she was admits here on 3/14 and given 2 liters of blood and told to 

start iron and birth control which she has not been taking.  on exam appears 

dry. tenderness lower abd. pelvic scant amount of active bleeding present. wbc 

normal. hemoglobin 7.9 and had hemoglobin of 8.3 two weeks ago. potassium and 

CO2 normal. is orthostatic. bun 77. cr was 8.03 and after 1 liter cr was 7.59. 

produced 100cc urine in 4 hours as she last urinated at 4:30. post void was 29. 

urine shows blood and bacteria with squamous cells and is currently on period. 

discussed with Dr Matias Alvarado at [facility] said should try fluid 

challenge and admit here and if renal function does not improve then have 

transfer. discussed with dr hobbs who states should talk with gyn. discussed 

with dr das that would not do anything about vaginal bleeding at this time as 

scant bleeding currently and hemoglobin is similiar to previous but dr oshea 

can be contacted tomorrow for follow up about procedure. discussed with 

hospitalist that after 3 liters of fluid bun 75 and cr 7.38 so not significant 

improvement with fluid so they recommend transfer for potential dialysis. 

patient has produced 70ml urine in 3 hours while here in [place]. dr Justin from 

[facility] agree to transfer





- Diagnoses


Differential Diagnoses - Female: Other - anemia, vaginal bleeding, dehyration


Provider Diagnoses: 


 Vaginal bleeding, Acute renal failure, Weakness








- Critical Care Time


Critical Care Time: 30-74 min - 60 mins





Discharge ED





- Sign-Out/Discharge


Documenting (check all that apply): Patient Departure





- Discharge Plan


Condition: Stable


Disposition: TRANS HIGHER LVL OF CARE FAC


Referrals: 


Zonia Hess MD [Primary Care Provider] - 





- Billing Disposition and Condition


Condition: STABLE


Disposition: Trans Higher Lvl of Care Fac





- Attestation Statements


Provider Attestation: 





I have seen the patient with the [facility] and agree with the plan and documentation 

below except as noted: 49 female recent fibroid biopsy presenting with scant 

vaginal bleeding found have new renal failure.  Plan for transfer.


Floyd Franco MD

## 2020-03-29 NOTE — XMS REPORT
Continuity of Care Document (CCD)

 Created on:2020



Patient:Gin Canchola

Sex:Female

:1970

External Reference #:[id number]





Demographics







 Address  [address]



   [place] [address]

 

 Mobile Phone  [phone]

 

 Email Address  [email or web address]

 

 Preferred Language  en

 

 Marital Status  Not  or 

 

 Lutheran Affiliation  Unknown

 

 Race  White

 

 Ethnic Group  Not  or 









Author







 Name  Zonia Hess MD (transmitted by agent of provider Melba Coles)

 

 Address  [address]



   Unavailable



   [place] [address]









Care Team Providers







 Name  Role  Phone

 

 Zonia Hess MD - Internal Medicine  Care Team Information   [phone]
[phone]









Problems







 Description

 

 No Information Available







Social History







 Type  Date  Description  Comments

 

 Birth Sex    Unknown  

 

 ETOH Use    Rarely consumes alcohol  

 

 Tobacco Use  Start: Unknown End:  Patient is a former  Smoked 5 cigars



   Unknown  smoker  daily from age



       [age]

 

 Recreational Drug Use    Denies Drug Use  

 

 Smoking Status  Reviewed: 20  Patient is a former  Smoked 5 cigars



     smoker  daily from age



       [age]

 

 Exercise Type/Frequency    Does not exercise  







Allergies, Adverse Reactions, Alerts







 Description

 

 No Known Drug Allergies







Medications







 Active Medications  SIG  Qnty  Indications  Ordering Provider  Date

 

 Ferrousul  take one tablet  45tabs  D50.9  Zonia Hess MD  2020



          325(65Fe)  every other day        



 mg Tablets          



           

 

 Asmanex HFA  1 puff twice a  2units    Tonya  2020



   day      KENDY Varner  



 100mcg/Act Aerosol          



           

 

 Albuterol Sulfate  use 1-2 puffs  18Inhaler    Eri Richards,  2020



 HFA  every 4-6 hours      MD  



    108(90Base)  as needed        



 mcg/Act Aerosol          



           









 History Medications









 Flovent HFA  1 puff twice daily  1units  J45.909  Tonya  2020 -



         KENDY Varner  2020



 110mcg/Act          



 Aerosol          



           

 

 Ventolin HFA  1 to 2 inhalations  8gm  J45.909  Tonya  2020 -



   every 4 to 6 hours      KENDY Varner  2020



 108(90Base)  as needed        



 mcg/Act Aerosol          



           

 

 Flovent HFA  Inhale 1 puff By  Ruthann Castaneda  2020 -



   Mouth Twice A Day      KENDY Varner  2020



 110mcg/Act          



 Aerosol          



           







Immunizations







 CPT Code  Status  Date  Vaccine  Reaction  Lot #

 

 [id number]  Given  2019  Tdap -  No immediate reaction  525np



       Tetanus/Diptheria/Acellular    



       Pertussis    







Vital Signs







 Date  Vital  Result  Comment

 

 2020  2:35pm  Height  63.5 inches  5'3.50"









 Weight  155.00 lb  

 

 Heart Rate  62 /min  

 

 BP Systolic Sitting  118 mmHg  

 

 BP Diastolic Sitting  77 mmHg  

 

 Body Temperature  97.4 F  

 

 O2 % BldC Oximetry  99 %  

 

 BMI (Body Mass Index)  27.0 kg/m2  









 2020  2:49pm  Height  63.5 inches  5'3.50"









 Weight  155.00 lb  

 

 Heart Rate  66 /min  

 

 BP Systolic Sitting  118 mmHg  

 

 BP Diastolic Sitting  65 mmHg  

 

 Body Temperature  97.8 F  

 

 O2 % BldC Oximetry  99 %  

 

 BMI (Body Mass Index)  27.0 kg/m2  







Results







 Test  Acquired Date  Facility  Test  Result  H/L  Range  Note

 

 Retic Count  2020  [facility]  Retic Count  1.1 %  Normal  0.5
-1.5  



     [address]  [place] [address] [phone]          









 Corrected Retic Count  0.5 %  Normal  0.5-1.5  

 

 Maturation Factor Retic  2.0      

 

 Retic Index  0.30      

 

 Mean Retic Volume  93.1      

 

 Immature Retic Fraction  0.42      

 

 RBC Retic Count  4.14 10^6/uL  Normal  3.70-4.87  

 

 Hematocrit for Retic CNT  21 %  Low  35-47  









 Iron & Iron  2020  [facility]  Total Iron  564 g/dL  High  
250-450  



 Binding    [address]  Binding        



 [place] [address]  Capacity        



     [phone]          









 Transferrin  403 mg/dL  High  203-362  

 

 Iron  < 20 g/dL  Low    

 

 Unsaturated Iron Binding  < 549 g/dL      

 

 % Iron Saturation  4 %  Low  15-55  









 Laboratory test  2020  [facility]  Ferritin  2.5 ng/mL  Low  
  



 finding    [address]          



     [place] [address] [phone]          

 

 Abo/RH Type  2020  [facility]  Patient Blood  A Positive      
1



     [address]  Type        



     [place] [address] [phone]          

 

 Type & Screen  2020  [facility]  Patient Blood  A Positive   
   



     [address]  Type        



     [place] [address]          



     [phone]          









 Antibody Screen  NEGATIVE      









 Laboratory test  2020  [facility]  Packed Cells  SEE RESULTS 
     2



 finding    [address]    BELO <SEE      



     [place] [address]    NOTE>      



     [phone]          

 

 Inr/Protime  2020  [facility]  Inr  1.22  High  0.82-  3



      [address]        [address]  



     [place] [address]          



     [phone]          

 

 Comp Metabolic  2020  [facility]  Sodium  137 mmol/L  Normal  
135-1  



 Panel     [address]  



     [place] [address] [phone]          









 Potassium  4.0 mmol/L  Normal  3.5-5.0  

 

 Chloride  105 mmol/L  Normal  101-111  

 

 Co2 Carbon Dioxide  25 mmol/L  Normal  22-32  

 

 Anion Gap  7 mmol/L  Normal  2-11  

 

 Glucose  74 mg/dL  Normal    

 

 Blood Urea Nitrogen  7 mg/dL  Normal  6-24  

 

 Creatinine  0.57 mg/dL  Normal  0.51-0.95  

 

 BUN/Creatinine Ratio  12.3  Normal  8-20  

 

 Calcium  9.2 mg/dL  Normal  8.6-10.3  

 

 Total Protein  7.2 g/dL  Normal  6.4-8.9  

 

 Albumin  4.2 g/dL  Normal  3.2-5.2  

 

 Globulin  3.0 g/dL  Normal  2-4  

 

 Albumin/Globulin Ratio  1.4  Normal  1-3  

 

 Total Bilirubin  0.90 mg/dL  Normal  0.2-1.0  

 

 Alkaline Phosphatase  49 U/L  Normal    

 

 Alt  8 U/L  Normal  7-52  

 

 Ast  14 U/L  Normal  13-39  

 

 Egfr Non-  113.7    >60  

 

 Egfr   137.6    >60  4









 Laboratory test  2020  [facility]  Troponin-I  0.00 ng/mL    <
0.03  5



 finding    [address]  (TnI)        



     [place] [address] [phone]          

 

 CBC Auto Diff  2020  [facility]  White Blood  3.4  Low  3.5-
10.8  



      [address]  Count  10^3/uL      



     [place] [address] [phone]          









 Red Blood Count  4.36 10^6/uL  Normal  3.70-4.87  

 

 Hemoglobin  6.1 g/dL  Critical low  12.0-16.0  6

 

 Hematocrit  23 %  Low  35-47  

 

 Mean Corpuscular Volume  52 fL  Low  80-97  

 

 Mean Corpuscular Hemoglobin  14 pg  Low  27-31  

 

 Mean Corpuscular HGB Conc  27 g/dL  Low  31-36  

 

 Red Cell Distribution Width  23 %  High  10-15  

 

 Platelet Count  248 10^3/uL  Normal  150-450  

 

 Mean Platelet Volume  8.8 fL  Normal  7.4-10.4  

 

 Abs Neutrophils  1.4 10^3/uL  Low  1.5-7.7  

 

 Abs Lymphocytes  1.4 10^3/uL  Normal  1.0-4.8  

 

 Abs Monocytes  0.4 10^3/uL  Normal  0-0.8  

 

 Abs Eosinophils  0.0 10^3/uL  Normal  0-0.6  

 

 Abs Basophils  0.1 10^3/uL  Normal  0-0.2  

 

 Abs Nucleated RBC  0.0 10^3/uL      

 

 Granulocyte %  41.6 %      

 

 Lymphocyte %  41.9 %      

 

 Monocyte %  12.9 %      

 

 Eosinophil %  1.4 %      

 

 Basophil %  2.2 %      

 

 Nucleated Red Blood Cells %  0.4      









 Cell Morphology  2020  [facility]  Microcytosis  2+      



     [address]          



     [place] [address] [phone]          









 Hypochromasia  2+      

 

 Anisocytosis  1+      









 CBC Auto  2020  [facility]  White Blood  4.1 10^3/uL  Normal  
3.5-10.8  7



 Diff    [address]  Count        



     [place] [address] [phone]          









 Red Blood Count  3.98 10^6/uL  Normal  3.70-4.87  

 

 Hemoglobin  5.7 g/dL  Critical low  12.0-16.0  8

 

 Hematocrit  21 %  Low  35-47  

 

 Mean Corpuscular Volume  52 fL  Low  80-97  

 

 Mean Corpuscular Hemoglobin  14 pg  Low  27-31  

 

 Mean Corpuscular HGB Conc  28 g/dL  Low  31-36  

 

 Red Cell Distribution Width  23 %  High  10-15  

 

 Platelet Count  247 10^3/uL  Normal  150-450  

 

 Mean Platelet Volume  9.0 fL  Normal  7.4-10.4  

 

 Abs Neutrophils  1.7 10^3/uL  Normal  1.5-7.7  

 

 Abs Lymphocytes  1.6 10^3/uL  Normal  1.0-4.8  

 

 Abs Monocytes  0.5 10^3/uL  Normal  0-0.8  

 

 Abs Eosinophils  0.1 10^3/uL  Normal  0-0.6  

 

 Abs Basophils  0.2 10^3/uL  Normal  0-0.2  

 

 Abs Nucleated RBC  0.0 10^3/uL      

 

 Granulocyte %  40.6 %      

 

 Lymphocyte %  39.9 %      

 

 Monocyte %  12.2 %      

 

 Eosinophil %  2.8 %      

 

 Basophil %  4.5 %      

 

 Nucleated Red Blood Cells %  0.2      









 Comp Metabolic  2020  [facility]  Sodium  138 mmol/L  Normal  
135-145  



 Panel    [address] [place] [address] [phone]          









 Potassium  4.0 mmol/L  Normal  3.5-5.0  

 

 Chloride  107 mmol/L  Normal  101-111  

 

 Co2 Carbon Dioxide  25 mmol/L  Normal  22-32  

 

 Anion Gap  6 mmol/L  Normal  2-11  

 

 Glucose  79 mg/dL  Normal    

 

 Blood Urea Nitrogen  8 mg/dL  Normal  6-24  

 

 Creatinine  0.52 mg/dL  Normal  0.51-0.95  

 

 BUN/Creatinine Ratio  15.4  Normal  8-20  

 

 Calcium  9.4 mg/dL  Normal  8.6-10.3  

 

 Total Protein  6.8 g/dL  Normal  6.4-8.9  

 

 Albumin  4.2 g/dL  Normal  3.2-5.2  

 

 Globulin  2.6 g/dL  Normal  2-4  

 

 Albumin/Globulin Ratio  1.6  Normal  1-3  

 

 Total Bilirubin  0.90 mg/dL  Normal  0.2-1.0  

 

 Alkaline Phosphatase  50 U/L  Normal    

 

 Alt  9 U/L  Normal  7-52  

 

 Ast  13 U/L  Normal  13-39  

 

 Egfr Non-  126.4    >60  

 

 Egfr   152.9    >60  9









 Cell Morphology  2020  [facility]  Microcytosis  3+      



     [address] [place] [address] [phone]          









 Hypochromasia  3+      

 

 Polychromasia  1+      

 

 Target Cells  1+      









 Laboratory test  2020  [facility]  Pathologist Review  (SEE 
NOTE)      10



 finding    [address] [place] [address] [phone]          









 1  GENERAL

 

 2  SEE RESULTS BELOW



   [id number]  AP        PC



   TRANSFUSED     20 1801



   [id number]  AP        PC



   TRANSFUSED     20 2308

 

 3  Standard intensity warfarin therapeutic range: 2.0-3.0



   High intensity warfarin therapeutic range: 2.5-3.5

 

 4  *******Because ethnic data is not always readily available,



   this report includes an eGFR for both -Americans and



   non- Americans.****



   The National Kidney Disease Education Program (NKDEP) does



   not endorse the use of the MDRD equation for patients that



   are not between the ages of 18 and 70, are pregnant, have



   extremes of body size, muscle mass, or nutritional status,



   or are non- or non-.



   According to the National Kidney Foundation, irrespective of



   diagnosis, the stage of the disease is based on the level of



   kidney function:



   Stage Description                      GFR(mL/min/1.73 m(2))



   1     Kidney damage with normal or decreased GFR       90



   2     Kidney damage with mild decrease in GFR          60-89



   3     Moderate decrease in GFR                         30-59



   4     Severe decrease in GFR                           15-29



   5     Kidney failure                       <15 (or dialysis)

 

 5  Troponin-I testing on Plasma Separator Tubes (PST) has a



   known false positive rate of 0.20-0.40%.  All positive



   troponins reflex immediately to secondary confirmatory



   testing.



   



   Using the [employer] DxI 800 Access Immunoassay systems, the



   99th percentile upper reference limit was demonstrated to be



   < 0.03 ng/mL.

 

 6  Verbal to  [id number] by [id number] at 1513 on 3/14/2020 .Results read back 
accurately

 

 7  ATTEMPTED Verbal by [id number] at 1944 on 20.     Verbal to DR DANIEL ROCKWELL by



   [id number] at 195

 

 8  ATTEMPTED Verbal by [id number] at 1944 on 20.



   



   Verbal to DR DANIEL ROCKWELL by [id number] at 1957 on 20.



   Results read back accurately.

 

 9  *******Because ethnic data is not always readily available,



   this report includes an eGFR for both -Americans and



   non- Americans.****



   The National Kidney Disease Education Program (NKDEP) does



   not endorse the use of the MDRD equation for patients that



   are not between the ages of 18 and 70, are pregnant, have



   extremes of body size, muscle mass, or nutritional status,



   or are non- or non-.



   According to the National Kidney Foundation, irrespective of



   diagnosis, the stage of the disease is based on the level of



   kidney function:



   Stage Description                      GFR(mL/min/1.73 m(2))



   1     Kidney damage with normal or decreased GFR       90



   2     Kidney damage with mild decrease in GFR          60-89



   3     Moderate decrease in GFR                         30-59



   4     Severe decrease in GFR                           15-29



   5     Kidney failure                       <15 (or dialysis)

 

 10  Microcytic hypochromic anemia.



   



   Reviewed by Татьяна Interiano MD







Procedures







 Date  Code  Description  Status

 

 2020  23685  EKG Tracing & Interpretation  Completed

 

 2020  90531  Polysomnography Sleep Staging 4+ Parameters  Completed

 

 2020  87740  Bronchospasm Provocation Evalu  Completed

 

 2020  02167  Diffusing Capacity  Completed

 

 2020  34417  Plethysmography Determination Lung Volumes & Per Airway  
Completed



     Resist  

 

 2020  80449  Pulmonary Function><Bronchodil  Completed

 

 2019  89041364  Mammogram  Completed







Medical Devices







 Description

 

 No Information Available







Encounters







 Type  Date  Location  Provider  Dx  Diagnosis

 

 Office Visit  2020  [facility]  Zonia Hess MD  D50.9  Iron deficiency



   3:40p  [facility]      anemia, unspecified









 N92.4  Excessive bleeding in the premenopausal period









 Office Visit  2020  [facility]  Tonya  J45.909  Unspecified



   8:00a  [facility]  KENDY Varner    asthma,



     [facility]      uncomplicated









 I27.20  Pulmonary hypertension, unspecified

 

 R06.83  Snoring









 Office Visit  2019  9:15a  [facility]  Eri  R06.02  Shortness of



     [facility]  MD Maurice    breath



     [facility]      









 R06.83  Snoring

 

 I27.20  Pulmonary hypertension, unspecified

 

 K21.9  Gastro-esophageal reflux disease without esophagitis









 Office Visit  2019  3:40p  [facility]  Qutaybeh S.  R00.2  
Palpitations



       ALONDRA Estes    









 I34.0  Nonrheumatic mitral (valve) insufficiency

 

 I27.20  Pulmonary hypertension, unspecified







Assessments







 Date  Code  Description  Provider

 

 2020  D50.9  Iron deficiency anemia, unspecified  Zonia Hess MD

 

 2020  N92.4  Excessive bleeding in the  Zonia Hess MD



     premenopausal period  

 

 2020  R07.89  Other chest pain  Zonia Hess MD

 

 2020  G47.33  Obstructive sleep apnea (adult)  Eri Richards MD



     (pediatric)  

 

 2020  J45.909  Unspecified asthma, uncomplicated  oTnya Varner NP

 

 2020  I27.20  Pulmonary hypertension, unspecified  Tonya Varner NP

 

 2020  R06.83  Snoring  Tonya Varner NP

 

 2020  J45.909  Unspecified asthma, uncomplicated  Eri Richards MD

 

 2020  R06.02  Shortness of breath  Eri Richards MD

 

 2019  R06.02  Shortness of breath  Eri Richards MD

 

 2019  R06.83  Snoring  Eri Richards MD

 

 2019  I27.20  Pulmonary hypertension, unspecified  Eri Richards MD

 

 2019  K21.9  Gastro-esophageal reflux disease  Eri Richards MD



     without esophagitis  

 

 2019  R00.2  Palpitations  Qutaybeh S. Maghaydah, M.D.

 

 2019  I34.0  Nonrheumatic mitral (valve)  Qutaybeh S. Maghaydah, M.D.



     insufficiency  

 

 2019  I27.20  Pulmonary hypertension, unspecified  Qutaybeh S. Maghaydah
, M.D.







Plan of Treatment

Future Appointment(s):2020  1:20 pm - Zonia Hess MD at [facility] 
[facility]2020 10:00 am - Tonya Varner NP at [facility] 
[facility]2020 - Zonia Hess, MDD50.9 Iron deficiency anemia, 
unspecifiedNew Medication:Ferrousul 325(65 Fe) mg - take one tablet every other 
dayFollow up:F/U 4 teguuiP20.4 Excessive bleeding in the premenopausal 
periodReferral:Tanisha Bo MD, OB/Gyn/Phys/Osteo



Functional Status







 Description

 

 No Information Available







Mental Status







 Description

 

 No Information Available







Referrals







 Refer to Dr  Reason for Referral  Status  Appt Date

 

 Tanisha Bo MD  Menorrhagia leading to iron def anemia;  Scheduled  2020



   needed 2 U PRBCs plus iron infusion; Large    



   5cm x 5 cm fibroids    









 [address] [place] [address] [phone]

 

 









 Eri Richards MD  sob, h/o smoking/ PHTN  Sent  2019









 [address]

 

 [address]

 

 [place] [address] [phone]

## 2020-03-29 NOTE — XMS REPORT
Continuity of Care Document (CCD)

 Created on:2020



Patient:Gin Canchola

Sex:Female

:1970

External Reference #:[id number]





Demographics







 Address  [address]



   [place] [address]

 

 Mobile Phone  [phone]

 

 Email Address  [email or web address]

 

 Preferred Language  en

 

 Marital Status  Not  or 

 

 Amish Affiliation  Unknown

 

 Race  White

 

 Ethnic Group  Not  or 









Author







 Name  Tanisha Bo MD (transmitted by agent of provider Mindy Braxton)

 

 Address  [address] , [address]



   [place] [address]









Care Team Providers







 Name  Role  Phone

 

 Zonia Hess MD - Internal Medicine  Care Team Information   [phone]
[phone]









Problems







 Description

 

 No Information Available







Social History







 Type  Date  Description  Comments

 

 Birth Sex    Unknown  

 

 ETOH Use    Rarely consumes alcohol  

 

 Tobacco Use  Start: Unknown End:  Patient is a former  Smoked 5 cigars



   Unknown  smoker  daily from age



       [age]

 

 Recreational Drug Use    Denies Drug Use  

 

 Smoking Status  Reviewed: 20  Patient is a former  Smoked 5 cigars



     smoker  daily from age



       [age]

 

 Exercise Type/Frequency    Does not exercise  







Allergies, Adverse Reactions, Alerts







 Description

 

 No Known Drug Allergies







Medications







 Active Medications  SIG  Qnty  Indications  Ordering Provider  Date

 

 Asmanex HFA  1 puff twice a  2units    Tonya  2020



   day      KENDY Varner  



 100mcg/Act Aerosol          



           

 

 Albuterol Sulfate  use 1-2 puffs  18Inhaler    Eri Richards,  2020



 HFA  every 4-6 hours      MD  



    108(90Base)  as needed        



 mcg/Act Aerosol          



           









 History Medications









 Ferrousul  take one tablet  45tabs  D50.9  Zonia Hess MD  2020 -



   every other day        2020



 325(65Fe) mg          



 Tablets          



           

 

 Flovent HFA  1 puff twice daily  1units  J45.909  Tonya  2020 -



         KENDY Varner  2020



 110mcg/Act          



 Aerosol          



           

 

 Ventolin HFA  1 to 2 inhalations  8gm  J45.909  Otnya  2020 -



   every 4 to 6 hours      KENDY Varner  2020



 108(90Base)  as needed        



 mcg/Act Aerosol          



           

 

 Flovent HFA  Inhale 1 puff By  Ruthann Castaneda  2020 -



   Mouth Twice A Day      KENDY Varner  2020



 110mcg/Act          



 Aerosol          



           







Immunizations







 CPT Code  Status  Date  Vaccine  Reaction  Lot #

 

 [id number]  Given  2019  Tdap -  No immediate reaction  525np



       Tetanus/Diptheria/Acellular    



       Pertussis    







Vital Signs







 Date  Vital  Result  Comment

 

 2020  8:47am  Height  65 inches  5'5"









 Weight  148.00 lb  

 

 Heart Rate  80 /min  

 

 BP Systolic  106 mmHg  

 

 BP Diastolic  65 mmHg  

 

 Body Temperature  97.9 F  

 

 O2 % BldC Oximetry  98 %  

 

 BMI (Body Mass Index)  24.6 kg/m2  









 2020  2:35pm  Height  63.5 inches  5'3.50"









 Weight  155.00 lb  

 

 Heart Rate  62 /min  

 

 BP Systolic Sitting  118 mmHg  

 

 BP Diastolic Sitting  77 mmHg  

 

 Body Temperature  97.4 F  

 

 O2 % BldC Oximetry  99 %  

 

 BMI (Body Mass Index)  27.0 kg/m2  







Results







 Test  Acquired Date  Facility  Test  Result  H/L  Range  Note

 

 Retic Count  2020  [facility]  Retic Count  1.1 %  Normal  0.5
-1.5  



     [address]  [place] [address] [phone]          









 Corrected Retic Count  0.5 %  Normal  0.5-1.5  

 

 Maturation Factor Retic  2.0      

 

 Retic Index  0.30      

 

 Mean Retic Volume  93.1      

 

 Immature Retic Fraction  0.42      

 

 RBC Retic Count  4.14 10^6/uL  Normal  3.70-4.87  

 

 Hematocrit for Retic CNT  21 %  Low  35-47  









 Iron & Iron  2020  [facility]  Total Iron  564 g/dL  High  
250-450  



 Binding    [address]  Binding        



 [place] [address]  Capacity        



     [phone]          









 Transferrin  403 mg/dL  High  203-362  

 

 Iron  < 20 g/dL  Low    

 

 Unsaturated Iron Binding  < 549 g/dL      

 

 % Iron Saturation  4 %  Low  15-55  









 Laboratory test  2020  [facility]  Ferritin  2.5 ng/mL  Low  
  



 finding    [address]          



     [place], [place] [id number] [phone]          

 

 Abo/RH Type  2020  [facility]  Patient Blood  A Positive      
1



     [address]  Type        



     [place] [address] [phone]          

 

 Type & Screen  2020  [facility]  Patient Blood  A Positive   
   



     [address]  Type        



     [place] [address]          



     [phone]          









 Antibody Screen  NEGATIVE      









 Laboratory test  2020  [facility]  Packed Cells  SEE RESULTS 
     2



 finding    [address]    BELO <SEE      



     [place] [address]    NOTE>      



     [phone]          

 

 Inr/Protime  2020  [facility]  Inr  1.22  High  0.82-  3



      [address]  



     [place], [place] [address] [phone]          

 

 Comp Metabolic  2020  [facility]  Sodium  137 mmol/L  Normal  
135-1  



 Panel     [address]  



     [place] [address] [phone]          









 Potassium  4.0 mmol/L  Normal  3.5-5.0  

 

 Chloride  105 mmol/L  Normal  101-111  

 

 Co2 Carbon Dioxide  25 mmol/L  Normal  22-32  

 

 Anion Gap  7 mmol/L  Normal  2-11  

 

 Glucose  74 mg/dL  Normal    

 

 Blood Urea Nitrogen  7 mg/dL  Normal  6-24  

 

 Creatinine  0.57 mg/dL  Normal  0.51-0.95  

 

 BUN/Creatinine Ratio  12.3  Normal  8-20  

 

 Calcium  9.2 mg/dL  Normal  8.6-10.3  

 

 Total Protein  7.2 g/dL  Normal  6.4-8.9  

 

 Albumin  4.2 g/dL  Normal  3.2-5.2  

 

 Globulin  3.0 g/dL  Normal  2-4  

 

 Albumin/Globulin Ratio  1.4  Normal  1-3  

 

 Total Bilirubin  0.90 mg/dL  Normal  0.2-1.0  

 

 Alkaline Phosphatase  49 U/L  Normal    

 

 Alt  8 U/L  Normal  7-52  

 

 Ast  14 U/L  Normal  13-39  

 

 Egfr Non-  113.7    >60  

 

 Egfr   137.6    >60  4









 Laboratory test  2020  [facility]  Troponin-I  0.00 ng/mL    <
0.03  5



 finding    [address]  (TnI)        



     [place] [address] [phone]          

 

 CBC Auto Diff  2020  [facility]  White Blood  3.4  Low  3.5-
10.8  



      [address]  Count  10^3/uL      



     [place] [address] [phone]          









 Red Blood Count  4.36 10^6/uL  Normal  3.70-4.87  

 

 Hemoglobin  6.1 g/dL  Critical low  12.0-16.0  6

 

 Hematocrit  23 %  Low  35-47  

 

 Mean Corpuscular Volume  52 fL  Low  80-97  

 

 Mean Corpuscular Hemoglobin  14 pg  Low  27-31  

 

 Mean Corpuscular HGB Conc  27 g/dL  Low  31-36  

 

 Red Cell Distribution Width  23 %  High  10-15  

 

 Platelet Count  248 10^3/uL  Normal  150-450  

 

 Mean Platelet Volume  8.8 fL  Normal  7.4-10.4  

 

 Abs Neutrophils  1.4 10^3/uL  Low  1.5-7.7  

 

 Abs Lymphocytes  1.4 10^3/uL  Normal  1.0-4.8  

 

 Abs Monocytes  0.4 10^3/uL  Normal  0-0.8  

 

 Abs Eosinophils  0.0 10^3/uL  Normal  0-0.6  

 

 Abs Basophils  0.1 10^3/uL  Normal  0-0.2  

 

 Abs Nucleated RBC  0.0 10^3/uL      

 

 Granulocyte %  41.6 %      

 

 Lymphocyte %  41.9 %      

 

 Monocyte %  12.9 %      

 

 Eosinophil %  1.4 %      

 

 Basophil %  2.2 %      

 

 Nucleated Red Blood Cells %  0.4      









 Cell Morphology  2020  [facility]  Microcytosis  2+      



     [address]          



     [place] [address] [phone]          









 Hypochromasia  2+      

 

 Anisocytosis  1+      









 CBC Auto  2020  [facility]  White Blood  4.1 10^3/uL  Normal  
3.5-10.8  7



 Diff    [address]  Count        



     [place] [address] [phone]          









 Red Blood Count  3.98 10^6/uL  Normal  3.70-4.87  

 

 Hemoglobin  5.7 g/dL  Critical low  12.0-16.0  8

 

 Hematocrit  21 %  Low  35-47  

 

 Mean Corpuscular Volume  52 fL  Low  80-97  

 

 Mean Corpuscular Hemoglobin  14 pg  Low  27-31  

 

 Mean Corpuscular HGB Conc  28 g/dL  Low  31-36  

 

 Red Cell Distribution Width  23 %  High  10-15  

 

 Platelet Count  247 10^3/uL  Normal  150-450  

 

 Mean Platelet Volume  9.0 fL  Normal  7.4-10.4  

 

 Abs Neutrophils  1.7 10^3/uL  Normal  1.5-7.7  

 

 Abs Lymphocytes  1.6 10^3/uL  Normal  1.0-4.8  

 

 Abs Monocytes  0.5 10^3/uL  Normal  0-0.8  

 

 Abs Eosinophils  0.1 10^3/uL  Normal  0-0.6  

 

 Abs Basophils  0.2 10^3/uL  Normal  0-0.2  

 

 Abs Nucleated RBC  0.0 10^3/uL      

 

 Granulocyte %  40.6 %      

 

 Lymphocyte %  39.9 %      

 

 Monocyte %  12.2 %      

 

 Eosinophil %  2.8 %      

 

 Basophil %  4.5 %      

 

 Nucleated Red Blood Cells %  0.2      









 Comp Metabolic  2020  [facility]  Sodium  138 mmol/L  Normal  
135-145  



 Panel    [address] [place] [address] [phone]          









 Potassium  4.0 mmol/L  Normal  3.5-5.0  

 

 Chloride  107 mmol/L  Normal  101-111  

 

 Co2 Carbon Dioxide  25 mmol/L  Normal  22-32  

 

 Anion Gap  6 mmol/L  Normal  2-11  

 

 Glucose  79 mg/dL  Normal    

 

 Blood Urea Nitrogen  8 mg/dL  Normal  6-24  

 

 Creatinine  0.52 mg/dL  Normal  0.51-0.95  

 

 BUN/Creatinine Ratio  15.4  Normal  8-20  

 

 Calcium  9.4 mg/dL  Normal  8.6-10.3  

 

 Total Protein  6.8 g/dL  Normal  6.4-8.9  

 

 Albumin  4.2 g/dL  Normal  3.2-5.2  

 

 Globulin  2.6 g/dL  Normal  2-4  

 

 Albumin/Globulin Ratio  1.6  Normal  1-3  

 

 Total Bilirubin  0.90 mg/dL  Normal  0.2-1.0  

 

 Alkaline Phosphatase  50 U/L  Normal    

 

 Alt  9 U/L  Normal  7-52  

 

 Ast  13 U/L  Normal  13-39  

 

 Egfr Non-  126.4    >60  

 

 Egfr   152.9    >60  9









 Cell Morphology  2020  [facility]  Microcytosis  3+      



     [address] [place] [address] [phone]          









 Hypochromasia  3+      

 

 Polychromasia  1+      

 

 Target Cells  1+      









 Laboratory test  2020  [facility]  Pathologist Review  (SEE 
NOTE)      10



 finding    [address] [place] [address] [phone]          









 1  GENERAL

 

 2  SEE RESULTS BELOW



   [id number]  AP        PC



   TRANSFUSED     20 1801



   [id number]  AP        PC



   TRANSFUSED     20 2308

 

 3  Standard intensity warfarin therapeutic range: 2.0-3.0



   High intensity warfarin therapeutic range: 2.5-3.5

 

 4  *******Because ethnic data is not always readily available,



   this report includes an eGFR for both -Americans and



   non- Americans.****



   The National Kidney Disease Education Program (NKDEP) does



   not endorse the use of the MDRD equation for patients that



   are not between the ages of 18 and 70, are pregnant, have



   extremes of body size, muscle mass, or nutritional status,



   or are non- or non-.



   According to the National Kidney Foundation, irrespective of



   diagnosis, the stage of the disease is based on the level of



   kidney function:



   Stage Description                      GFR(mL/min/1.73 m(2))



   1     Kidney damage with normal or decreased GFR       90



   2     Kidney damage with mild decrease in GFR          60-89



   3     Moderate decrease in GFR                         30-59



   4     Severe decrease in GFR                           15-29



   5     Kidney failure                       <15 (or dialysis)

 

 5  Troponin-I testing on Plasma Separator Tubes (PST) has a



   known false positive rate of 0.20-0.40%.  All positive



   troponins reflex immediately to secondary confirmatory



   testing.



   



   Using the Evomail DxI 800 Access Immunoassay systems, the



   99th percentile upper reference limit was demonstrated to be



   < 0.03 ng/mL.

 

 6  Verbal to  [id number] by [id number] at 1513 on 3/14/2020 .Results read back 
accurately

 

 7  ATTEMPTED Verbal by [id number] at 1944 on 20.     Verbal to DR DANIEL ROCKWELL by



   [id number] at 195

 

 8  ATTEMPTED Verbal by [id number] at 1944 on 20.



   



   Verbal to DR DANIEL ROCKWELL by [id number] at 1957 on 20.



   Results read back accurately.

 

 9  *******Because ethnic data is not always readily available,



   this report includes an eGFR for both -Americans and



   non- Americans.****



   The National Kidney Disease Education Program (NKDEP) does



   not endorse the use of the MDRD equation for patients that



   are not between the ages of 18 and 70, are pregnant, have



   extremes of body size, muscle mass, or nutritional status,



   or are non- or non-.



   According to the National Kidney Foundation, irrespective of



   diagnosis, the stage of the disease is based on the level of



   kidney function:



   Stage Description                      GFR(mL/min/1.73 m(2))



   1     Kidney damage with normal or decreased GFR       90



   2     Kidney damage with mild decrease in GFR          60-89



   3     Moderate decrease in GFR                         30-59



   4     Severe decrease in GFR                           15-29



   5     Kidney failure                       <15 (or dialysis)

 

 10  Microcytic hypochromic anemia.



   



   Reviewed by Татьяна Interiano MD







Procedures







 Date  Code  Description  Status

 

 2020  53658  Endometrial Sampling W Or W/O Endocervical BX W Or W/O  
Completed



     Cerv Dilat  

 

 2020  36219  EKG Tracing & Interpretation  Completed

 

 2020  69361  Polysomnography Sleep Staging 4+ Parameters  Completed

 

 2020  42728  Bronchospasm Provocation Evalu  Completed

 

 2020  59454  Diffusing Capacity  Completed

 

 2020  15516  Plethysmography Determination Lung Volumes & Per Airway  
Completed



     Resist  

 

 2020  12900  Pulmonary Function><Bronchodil  Completed

 

 2019  55038373  Mammogram  Completed







Medical Devices







 Description

 

 No Information Available







Encounters







 Type  Date  Location  Provider  Dx  Diagnosis

 

 Office Visit  2020  [facility],  N92.4  Excessive 
bleeding in



   9:00a  [facility]    the premenopausal



           period









 D25.9  Leiomyoma of uterus, unspecified

 

 D50.9  Iron deficiency anemia, unspecified









 Office Visit  2020  3:40p  [facility]  Zonia Hess,  D50.9  Iron 
deficiency



     [facility]    anemia,



           unspecified









 N92.4  Excessive bleeding in the premenopausal period









 Office Visit  2020  [facility]  Zonia Hess,  R07.89  Other chest pain



   3:00p  Med[facility] - [facility]    

 

 Office Visit  2020  [facility]  Tonya  J45.909  Unspecified



   8:00a  [facility]  KENDY Varner    asthma,



     Cma      uncomplicated









 I27.20  Pulmonary hypertension, unspecified

 

 R06.83  Snoring









 Office Visit  2019  9:15a  [facility]  Eri  R06.02  Shortness of



     [facility]  MD Maurice    breath



     [facility]      









 R06.83  Snoring

 

 I27.20  Pulmonary hypertension, unspecified

 

 K21.9  Gastro-esophageal reflux disease without esophagitis









 Office Visit  2019  3:40p  [facility]  Qutaybeh S.  R00.2  
Palpitations



       ALONDRA Estes    









 I34.0  Nonrheumatic mitral (valve) insufficiency

 

 I27.20  Pulmonary hypertension, unspecified







Assessments







 Date  Code  Description  Provider

 

 2020  N92.4  Excessive bleeding in the  Tanisha Bo MD



     premenopausal period  

 

 2020  D25.9  Leiomyoma of uterus, unspecified  Tanisha Bo MD

 

 2020  D50.9  Iron deficiency anemia, unspecified  Tanisha Bo MD

 

 2020  D50.9  Iron deficiency anemia, unspecified  Zonia Hess MD

 

 2020  N92.4  Excessive bleeding in the  Zonia Hess MD



     premenopausal period  

 

 2020  R07.89  Other chest pain  Zonia Hess MD

 

 2020  G47.33  Obstructive sleep apnea (adult)  Eri Richards MD



     (pediatric)  

 

 2020  J45.909  Unspecified asthma, uncomplicated  Tonya Varner NP

 

 2020  I27.20  Pulmonary hypertension, unspecified  Tonya Varner NP

 

 2020  R06.83  Snoring  Tonya Varner NP

 

 2020  J45.909  Unspecified asthma, uncomplicated  Eri Richards MD

 

 2020  R06.02  Shortness of breath  Eri Richards MD

 

 2019  R06.02  Shortness of breath  Eri Richards MD

 

 2019  R06.83  Snoring  Eri Richards MD

 

 2019  I27.20  Pulmonary hypertension, unspecified  Eri Richards MD

 

 2019  K21.9  Gastro-esophageal reflux disease  Eri Richards MD



     without esophagitis  

 

 2019  R00.2  Palpitations  Qutaybeh S. Maghaydah, M.D.

 

 2019  I34.0  Nonrheumatic mitral (valve)  Qutaybeh S. Maghaydah, M.D.



     insufficiency  

 

 2019  I27.20  Pulmonary hypertension, unspecified  Qutaybeh S. Maghaydah
, M.D.







Plan of Treatment

Future Appointment(s):2020  1:20 pm - Zonia Hess MD at [facility] 
Medicine - [facility]2020 10:00 am - Tonya Varner NP at [facility] 
[facility]2020 - Tanisha Bo MDN92.4 Excessive bleeding in 
the premenopausal periodNew Labs:Surgical Pathology, Ordered: 20Referral:
Magnus Baez MD, Radiology,Angiography,InvD25.9 Leiomyoma of uterus, 
xstkxsfnrnrR88.9 Iron deficiency anemia, unspecifiedFollow up:Iron tablet-one 
in the morning, one in the evening Iron rich foods (beef, meat, fish, dark 
green vegetables, beans, nuts). Drink a lot of water and tea. Vitamin C 
supplement each day Recommend Ibuprofen 200 milligram tablet, 2 tablets every 4 
hours if needed for cramping/pelvic pain (gave in an envelope). Heat pack or 
water bottle to lower belly and lower back call our office if bleeding becomes 
heavy, if pain becomes strong or if you feel lightheaded, dizzy, short of breath
, heart palpitations or persistent headache.



Functional Status







 Description

 

 No Information Available







Mental Status







 Description

 

 No Information Available







Referrals







 Refer to Dr  Reason for Referral  Status  Appt Date

 

 Magnus Baez MD  48yo P3 fibroid uterus, heavy vaginal  Created  00/00/
0000



   bleeding with menses, led to severe iron defy    



   anemia with blood txn 3/13/2020.  HgB was 5.7    



   at admission. Endometrial biopsy today if    



   benign would be good candidate for UFE.    



   Liberian is first language but understands    



   English well. Thank you    









 [address] [facility]

 

 [address]

 

 [place] [address] [phone]

 

 









 Tanisha Bo MD  Menorrhagia leading to iron def anemia;  Scheduled  2020



   needed 2 U PRBCs plus iron infusion; Large    



   5cm x 5 cm fibroids    









 [address]

 

 [address]

 

 [place] [address] [phone]

 

 









 Eri Richards MD  sob, h/o smoking/ PHTN  Sent  2019









 [address]

 

 [address]

 

 [place] [address] [phone]

## 2020-03-29 NOTE — XMS REPORT
Continuity of Care Document (CCD)

 Created on:2020



Patient:Gin Canchola

Sex:Female

:1970

External Reference #:[id number]





Demographics







 Address  [address]



   [place] [address]

 

 Mobile Phone  [phone]

 

 Email Address  [email or web address] (P) [email or web address]

 

 Preferred Language  en

 

 Marital Status  Not  or 

 

 Sabianism Affiliation  Unknown

 

 Race  White

 

 Ethnic Group  Not  or 









Author







 Name  Zonia Hess MD (transmitted by agent of provider Melba Coles)

 

 Address  [address]



   Unavailable



   [place] [address]









Care Team Providers







 Name  Role  Phone

 

 Zonia Hess MD - Internal Medicine  Care Team Information   [phone]
[phone]









Problems







 Description

 

 No Information Available







Social History







 Type  Date  Description  Comments

 

 Birth Sex    Unknown  

 

 ETOH Use    Rarely consumes alcohol  

 

 Tobacco Use  Start: Unknown End:  Patient is a former  Smoked 5 cigars



   Unknown  smoker  daily from age



       [age]

 

 Recreational Drug Use    Denies Drug Use  

 

 Smoking Status  Reviewed: 20  Patient is a former  Smoked 5 cigars



     smoker  daily from age



       [age]

 

 Exercise Type/Frequency    Does not exercise  







Allergies, Adverse Reactions, Alerts







 Description

 

 No Known Drug Allergies







Medications







 Active Medications  SIG  Qnty  Indications  Ordering Provider  Date

 

 Ferrousul  take one tablet  45tabs  D50.9  Zonia Hess MD  2020



          325(65Fe)  every other day        



 mg Tablets          



           

 

 Asmanex HFA  1 puff twice a  2units    Tonya  2020



   day      KENDY Varner  



 100mcg/Act Aerosol          



           

 

 Albuterol Sulfate  use 1-2 puffs  18Inhaler    Eri Richards,  2020



 HFA  every 4-6 hours      MD  



    108(90Base)  as needed        



 mcg/Act Aerosol          



           









 History Medications









 Flovent HFA  1 puff twice daily  1units  J45.909  Tonya  2020 -



         KENDY Varner  2020



 110mcg/Act          



 Aerosol          



           

 

 Ventolin HFA  1 to 2 inhalations  8gm  J45.909  Tonya  2020 -



   every 4 to 6 hours      KENDY Varner  2020



 108(90Base)  as needed        



 mcg/Act Aerosol          



           

 

 Flovent HFA  Inhale 1 puff By  Ruthann Castaneda  2020 -



   Mouth Twice A Day      KENDY Varner  2020



 110mcg/Act          



 Aerosol          



           







Immunizations







 CPT Code  Status  Date  Vaccine  Reaction  Lot #

 

 [id number]  Given  2019  Tdap -  No immediate reaction  525np



       Tetanus/Diptheria/Acellular    



       Pertussis    







Vital Signs







 Date  Vital  Result  Comment

 

 2020  2:35pm  Height  63.5 inches  5'3.50"









 Weight  155.00 lb  

 

 Heart Rate  62 /min  

 

 BP Systolic Sitting  118 mmHg  

 

 BP Diastolic Sitting  77 mmHg  

 

 Body Temperature  97.4 F  

 

 O2 % BldC Oximetry  99 %  

 

 BMI (Body Mass Index)  27.0 kg/m2  









 2020  2:49pm  Height  63.5 inches  5'3.50"









 Weight  155.00 lb  

 

 Heart Rate  66 /min  

 

 BP Systolic Sitting  118 mmHg  

 

 BP Diastolic Sitting  65 mmHg  

 

 Body Temperature  97.8 F  

 

 O2 % BldC Oximetry  99 %  

 

 BMI (Body Mass Index)  27.0 kg/m2  







Results







 Test  Acquired Date  Facility  Test  Result  H/L  Range  Note

 

 Retic Count  2020  [facility]  Retic Count  1.1 %  Normal  0.5
-1.5  



     [address]  [place] [address] [phone]          









 Corrected Retic Count  0.5 %  Normal  0.5-1.5  

 

 Maturation Factor Retic  2.0      

 

 Retic Index  0.30      

 

 Mean Retic Volume  93.1      

 

 Immature Retic Fraction  0.42      

 

 RBC Retic Count  4.14 10^6/uL  Normal  3.70-4.87  

 

 Hematocrit for Retic CNT  21 %  Low  35-47  









 Iron & Iron  2020  [facility]  Total Iron  564 g/dL  High  
250-450  



 Binding    [address]  Binding        



 [place] [address]  Capacity        



     [phone]          









 Transferrin  403 mg/dL  High  203-362  

 

 Iron  < 20 g/dL  Low    

 

 Unsaturated Iron Binding  < 549 g/dL      

 

 % Iron Saturation  4 %  Low  15-55  









 Laboratory test  2020  [facility]  Ferritin  2.5 ng/mL  Low  
  



 finding    [address]          



     [place] [address] [phone]          

 

 Abo/RH Type  2020  [facility]  Patient Blood  A Positive      
1



     [address]  Type        



     [place] [address] [phone]          

 

 Type & Screen  2020  [facility]  Patient Blood  A Positive   
   



     [address]  Type        



     [place] [address]          



     [phone]          









 Antibody Screen  NEGATIVE      









 Laboratory test  2020  [facility]  Packed Cells  SEE RESULTS 
     2



 finding    [address]    BELO <SEE      



     [place] [address]    NOTE>      



     [phone]          

 

 Inr/Protime  2020  [facility]  Inr  1.22  High  0.82-  3



      [address]        [address]  



     [place] [address]          



     [phone]          

 

 Comp Metabolic  2020  [facility]  Sodium  137 mmol/L  Normal  
135-1  



 Panel     [address]  



     [place] [address] [phone]          









 Potassium  4.0 mmol/L  Normal  3.5-5.0  

 

 Chloride  105 mmol/L  Normal  101-111  

 

 Co2 Carbon Dioxide  25 mmol/L  Normal  22-32  

 

 Anion Gap  7 mmol/L  Normal  2-11  

 

 Glucose  74 mg/dL  Normal    

 

 Blood Urea Nitrogen  7 mg/dL  Normal  6-24  

 

 Creatinine  0.57 mg/dL  Normal  0.51-0.95  

 

 BUN/Creatinine Ratio  12.3  Normal  8-20  

 

 Calcium  9.2 mg/dL  Normal  8.6-10.3  

 

 Total Protein  7.2 g/dL  Normal  6.4-8.9  

 

 Albumin  4.2 g/dL  Normal  3.2-5.2  

 

 Globulin  3.0 g/dL  Normal  2-4  

 

 Albumin/Globulin Ratio  1.4  Normal  1-3  

 

 Total Bilirubin  0.90 mg/dL  Normal  0.2-1.0  

 

 Alkaline Phosphatase  49 U/L  Normal    

 

 Alt  8 U/L  Normal  7-52  

 

 Ast  14 U/L  Normal  13-39  

 

 Egfr Non-  113.7    >60  

 

 Egfr   137.6    >60  4









 Laboratory test  2020  [facility]  Troponin-I  0.00 ng/mL    <
0.03  5



 finding    [address]  (TnI)        



     [place] [address] [phone]          

 

 CBC Auto Diff  2020  [facility]  White Blood  3.4  Low  3.5-
10.8  



      [address]  Count  10^3/uL      



     [place] [address] [phone]          









 Red Blood Count  4.36 10^6/uL  Normal  3.70-4.87  

 

 Hemoglobin  6.1 g/dL  Critical low  12.0-16.0  6

 

 Hematocrit  23 %  Low  35-47  

 

 Mean Corpuscular Volume  52 fL  Low  80-97  

 

 Mean Corpuscular Hemoglobin  14 pg  Low  27-31  

 

 Mean Corpuscular HGB Conc  27 g/dL  Low  31-36  

 

 Red Cell Distribution Width  23 %  High  10-15  

 

 Platelet Count  248 10^3/uL  Normal  150-450  

 

 Mean Platelet Volume  8.8 fL  Normal  7.4-10.4  

 

 Abs Neutrophils  1.4 10^3/uL  Low  1.5-7.7  

 

 Abs Lymphocytes  1.4 10^3/uL  Normal  1.0-4.8  

 

 Abs Monocytes  0.4 10^3/uL  Normal  0-0.8  

 

 Abs Eosinophils  0.0 10^3/uL  Normal  0-0.6  

 

 Abs Basophils  0.1 10^3/uL  Normal  0-0.2  

 

 Abs Nucleated RBC  0.0 10^3/uL      

 

 Granulocyte %  41.6 %      

 

 Lymphocyte %  41.9 %      

 

 Monocyte %  12.9 %      

 

 Eosinophil %  1.4 %      

 

 Basophil %  2.2 %      

 

 Nucleated Red Blood Cells %  0.4      









 Cell Morphology  2020  [facility]  Microcytosis  2+      



     [address]          



     [place] [address] [phone]          









 Hypochromasia  2+      

 

 Anisocytosis  1+      









 CBC Auto  2020  [facility]  White Blood  4.1 10^3/uL  Normal  
3.5-10.8  7



 Diff    [address]  Count        



     [place] [address] [phone]          









 Red Blood Count  3.98 10^6/uL  Normal  3.70-4.87  

 

 Hemoglobin  5.7 g/dL  Critical low  12.0-16.0  8

 

 Hematocrit  21 %  Low  35-47  

 

 Mean Corpuscular Volume  52 fL  Low  80-97  

 

 Mean Corpuscular Hemoglobin  14 pg  Low  27-31  

 

 Mean Corpuscular HGB Conc  28 g/dL  Low  31-36  

 

 Red Cell Distribution Width  23 %  High  10-15  

 

 Platelet Count  247 10^3/uL  Normal  150-450  

 

 Mean Platelet Volume  9.0 fL  Normal  7.4-10.4  

 

 Abs Neutrophils  1.7 10^3/uL  Normal  1.5-7.7  

 

 Abs Lymphocytes  1.6 10^3/uL  Normal  1.0-4.8  

 

 Abs Monocytes  0.5 10^3/uL  Normal  0-0.8  

 

 Abs Eosinophils  0.1 10^3/uL  Normal  0-0.6  

 

 Abs Basophils  0.2 10^3/uL  Normal  0-0.2  

 

 Abs Nucleated RBC  0.0 10^3/uL      

 

 Granulocyte %  40.6 %      

 

 Lymphocyte %  39.9 %      

 

 Monocyte %  12.2 %      

 

 Eosinophil %  2.8 %      

 

 Basophil %  4.5 %      

 

 Nucleated Red Blood Cells %  0.2      









 Comp Metabolic  2020  [facility]  Sodium  138 mmol/L  Normal  
135-145  



 Panel    [address] [place] [address] [phone]          









 Potassium  4.0 mmol/L  Normal  3.5-5.0  

 

 Chloride  107 mmol/L  Normal  101-111  

 

 Co2 Carbon Dioxide  25 mmol/L  Normal  22-32  

 

 Anion Gap  6 mmol/L  Normal  2-11  

 

 Glucose  79 mg/dL  Normal    

 

 Blood Urea Nitrogen  8 mg/dL  Normal  6-24  

 

 Creatinine  0.52 mg/dL  Normal  0.51-0.95  

 

 BUN/Creatinine Ratio  15.4  Normal  8-20  

 

 Calcium  9.4 mg/dL  Normal  8.6-10.3  

 

 Total Protein  6.8 g/dL  Normal  6.4-8.9  

 

 Albumin  4.2 g/dL  Normal  3.2-5.2  

 

 Globulin  2.6 g/dL  Normal  2-4  

 

 Albumin/Globulin Ratio  1.6  Normal  1-3  

 

 Total Bilirubin  0.90 mg/dL  Normal  0.2-1.0  

 

 Alkaline Phosphatase  50 U/L  Normal    

 

 Alt  9 U/L  Normal  7-52  

 

 Ast  13 U/L  Normal  13-39  

 

 Egfr Non-  126.4    >60  

 

 Egfr   152.9    >60  9









 Cell Morphology  2020  [facility]  Microcytosis  3+      



     [address] [place] [address] [phone]          









 Hypochromasia  3+      

 

 Polychromasia  1+      

 

 Target Cells  1+      









 Laboratory test  2020  [facility]  Pathologist Review  (SEE 
NOTE)      10



 finding    [address] [place] [address] [phone]          









 1  GENERAL

 

 2  SEE RESULTS BELOW



   [id number]  AP        PC



   TRANSFUSED     20 1801



   [id number]  AP        PC



   TRANSFUSED     20 2308

 

 3  Standard intensity warfarin therapeutic range: 2.0-3.0



   High intensity warfarin therapeutic range: 2.5-3.5

 

 4  *******Because ethnic data is not always readily available,



   this report includes an eGFR for both -Americans and



   non- Americans.****



   The National Kidney Disease Education Program (NKDEP) does



   not endorse the use of the MDRD equation for patients that



   are not between the ages of 18 and 70, are pregnant, have



   extremes of body size, muscle mass, or nutritional status,



   or are non- or non-.



   According to the National Kidney Foundation, irrespective of



   diagnosis, the stage of the disease is based on the level of



   kidney function:



   Stage Description                      GFR(mL/min/1.73 m(2))



   1     Kidney damage with normal or decreased GFR       90



   2     Kidney damage with mild decrease in GFR          60-89



   3     Moderate decrease in GFR                         30-59



   4     Severe decrease in GFR                           15-29



   5     Kidney failure                       <15 (or dialysis)

 

 5  Troponin-I testing on Plasma Separator Tubes (PST) has a



   known false positive rate of 0.20-0.40%.  All positive



   troponins reflex immediately to secondary confirmatory



   testing.



   



   Using the [employer] DxI 800 Access Immunoassay systems, the



   99th percentile upper reference limit was demonstrated to be



   < 0.03 ng/mL.

 

 6  Verbal to  [id number] by [id number] at 1513 on 3/14/2020 .Results read back 
accurately

 

 7  ATTEMPTED Verbal by [email or web address] at 1944 on 20.     Verbal to DR DANIEL ROCKWELL by



   [id number] at 195

 

 8  ATTEMPTED Verbal by [email or web address] at 1944 on 20.



   



   Verbal to DR DANIEL ROCKWELL by [id number] at 1957 on 20.



   Results read back accurately.

 

 9  *******Because ethnic data is not always readily available,



   this report includes an eGFR for both -Americans and



   non- Americans.****



   The National Kidney Disease Education Program (NKDEP) does



   not endorse the use of the MDRD equation for patients that



   are not between the ages of 18 and 70, are pregnant, have



   extremes of body size, muscle mass, or nutritional status,



   or are non- or non-.



   According to the National Kidney Foundation, irrespective of



   diagnosis, the stage of the disease is based on the level of



   kidney function:



   Stage Description                      GFR(mL/min/1.73 m(2))



   1     Kidney damage with normal or decreased GFR       90



   2     Kidney damage with mild decrease in GFR          60-89



   3     Moderate decrease in GFR                         30-59



   4     Severe decrease in GFR                           15-29



   5     Kidney failure                       <15 (or dialysis)

 

 10  Microcytic hypochromic anemia.



   



   Reviewed by Татьяна Interiano MD







Procedures







 Date  Code  Description  Status

 

 2020  36207  EKG Tracing & Interpretation  Completed

 

 2020  43518  Polysomnography Sleep Staging 4+ Parameters  Completed

 

 2020  67523  Bronchospasm Provocation Evalu  Completed

 

 2020  49141  Diffusing Capacity  Completed

 

 2020  82738  Plethysmography Determination Lung Volumes & Per Airway  
Completed



     Resist  

 

 2020  11195  Pulmonary Function><Bronchodil  Completed

 

 2019  62532461  Mammogram  Completed







[employer] Devices







 Description

 

 No Information Available







Encounters







 Type  Date  Location  Provider  Dx  Diagnosis

 

 Office Visit  2020  [facility]  Zonia eHss MD  D50.9  Iron deficiency



   3:40p  Med[facility] - [facility]      anemia, unspecified









 N92.4  Excessive bleeding in the premenopausal period









 Office Visit  2020  [facility]  Zonia Hess,  R07.89  Other chest pain



   3:00p  Med[facility] - [facility]  MD    

 

 Office Visit  2020  [facility]  Tonya  J45.909  Unspecified



   8:00a  [facility]  KENDY Varner    asthma,



     [facility]      uncomplicated









 I27.20  Pulmonary hypertension, unspecified

 

 R06.83  Snoring









 Office Visit  2019  9:15a  [facility]  Eri  R06.02  Shortness of



     [facility]  MD Maurice    breath



     [facility]      









 R06.83  Snoring

 

 I27.20  Pulmonary hypertension, unspecified

 

 K21.9  Gastro-esophageal reflux disease without esophagitis









 Office Visit  2019  3:40p  [facility]  Qutaybeh S.  R00.2  
Palpitations



       ALONDRA Estes    









 I34.0  Nonrheumatic mitral (valve) insufficiency

 

 I27.20  Pulmonary hypertension, unspecified







Assessments







 Date  Code  Description  Provider

 

 2020  D50.9  Iron deficiency anemia, unspecified  Zonia Hess MD

 

 2020  N92.4  Excessive bleeding in the  Zonia Hess MD



     premenopausal period  

 

 2020  R07.89  Other chest pain  Zonia Hess MD

 

 2020  G47.33  Obstructive sleep apnea (adult)  Eri Richards MD



     (pediatric)  

 

 2020  J45.909  Unspecified asthma, uncomplicated  Tonya Varner, KENDY

 

 2020  I27.20  Pulmonary hypertension, unspecified  Tonya Varner NP

 

 2020  R06.83  Snoring  Tonya Varner NP

 

 2020  J45.909  Unspecified asthma, uncomplicated  Eri Richards MD

 

 2020  R06.02  Shortness of breath  Eri Richards MD

 

 2019  R06.02  Shortness of breath  Eri Richards MD

 

 2019  R06.83  Snoring  Eri Richards MD

 

 2019  I27.20  Pulmonary hypertension, unspecified  Eri Richards MD

 

 2019  K21.9  Gastro-esophageal reflux disease  Eri Richards MD



     without esophagitis  

 

 2019  R00.2  Palpitations  Qutaybeh S. Maghaydah, M.D.

 

 2019  I34.0  Nonrheumatic mitral (valve)  Qutaybeh S. Maghaydah, M.D.



     insufficiency  

 

 2019  I27.20  Pulmonary hypertension, unspecified  Qutaybeh S. Maghaydah
, M.D.







Plan of Treatment

Future Appointment(s):2020  1:20 pm - Zonia Hess MD at [facility] am - [facility] Varner [facility] at [facility]2020 - Zonia Hess, MDD50.9 Iron deficiency anemia, 
unspecifiedNew Medication:Ferrousul 325(65 Fe) mg - take one tablet every other 
dayFollow up:F/U 4 sbyyrfS44.4 Excessive bleeding in the premenopausal 
periodReferral:Tanisha Bo MD, OB/Gyn/Phys/Osteo



Functional Status







 Description

 

 No Information Available







Mental Status







 Description

 

 No Information Available







Referrals







 Refer to   Reason for Referral  Status  Appt Date

 

 Tanisha Bo MD  Menorrhagia leading to iron def anemia;  Scheduled  2020



   needed 2 U PRBCs plus iron infusion; Large    



   5cm x 5 cm fibroids    









 [address] [place] [id number] [phone]

 

 









 Eri Richards MD  sob, h/o smoking/ PHTN  Sent  2019









 [address]

 

 [address]

 

 [place] [address] [phone]

## 2020-03-29 NOTE — XMS REPORT
Continuity of Care Document (CCD)

 Created on:2020



Patient:Gin Canchola

Sex:Female

:1970

External Reference #:[id number]





Demographics







 Address  [address]



   [place] [address]

 

 Mobile Phone  [phone]

 

 Email Address  [email or web address]

 

 Preferred Language  en

 

 Marital Status  Not  or 

 

 Pentecostal Affiliation  Unknown

 

 Race  White

 

 Ethnic Group  Not  or 









Author







 Name  Eri Richards MD (transmitted by agent of provider Latonia Cochran)

 

 Address  [address], [address]



   Unavailable



   [place] [address]









Care Team Providers







 Name  Role  Phone

 

 Zonia Hess MD - Internal Medicine  Care Team Information   [phone]
[phone]









Problems







 Description

 

 No Information Available







Social History







 Type  Date  Description  Comments

 

 Birth Sex    Unknown  

 

 ETOH Use    Rarely consumes alcohol  

 

 Tobacco Use  Start: Unknown End:  Patient is a former  Smoked 5 cigars



   Unknown  smoker  daily from age



       [age]

 

 Recreational Drug Use    Denies Drug Use  

 

 Smoking Status  Reviewed: 20  Patient is a former  Smoked 5 cigars



     smoker  daily from age



       [age]

 

 Exercise Type/Frequency    Does not exercise  







Allergies, Adverse Reactions, Alerts







 Description

 

 No Known Drug Allergies







Medications







 Active Medications  SIG  Qnty  Indications  Ordering Provider  Date

 

 Ferrousul  take one tablet  45tabs  D50.9  Zonia Hess MD  2020



          325(65Fe)  every other day        



 mg Tablets          



           

 

 Asmanex HFA  1 puff twice a  2units    Tonya  2020



   day      KENDY Varner  



 100mcg/Act Aerosol          



           

 

 Albuterol Sulfate  use 1-2 puffs  18Inhaler    Eri Richards,  2020



 HFA  every 4-6 hours      MD  



    108(90Base)  as needed        



 mcg/Act Aerosol          



           









 History Medications









 Flovent HFA  1 puff twice daily  1units  J45.909  Tonya  2020 -



         KENDY Varner  2020



 110mcg/Act          



 Aerosol          



           

 

 Ventolin HFA  1 to 2 inhalations  8gm  J45.909  Tonya  2020 -



   every 4 to 6 hours      KENDY Varner  2020



 108(90Base)  as needed        



 mcg/Act Aerosol          



           

 

 Flovent HFA  Inhale 1 puff By  Ruthann Castaneda  2020 -



   Mouth Twice A Day      KENDY Varner  2020



 110mcg/Act          



 Aerosol          



           







Immunizations







 CPT Code  Status  Date  Vaccine  Reaction  Lot #

 

 [id number]  Given  2019  Tdap -  No immediate reaction  525np



       Tetanus/Diptheria/Acellular    



       Pertussis    







Vital Signs







 Date  Vital  Result  Comment

 

 2020  2:35pm  Height  63.5 inches  5'3.50"









 Weight  155.00 lb  

 

 Heart Rate  62 /min  

 

 BP Systolic Sitting  118 mmHg  

 

 BP Diastolic Sitting  77 mmHg  

 

 Body Temperature  97.4 F  

 

 O2 % BldC Oximetry  99 %  

 

 BMI (Body Mass Index)  27.0 kg/m2  









 2020  2:49pm  Height  63.5 inches  5'3.50"









 Weight  155.00 lb  

 

 Heart Rate  66 /min  

 

 BP Systolic Sitting  118 mmHg  

 

 BP Diastolic Sitting  65 mmHg  

 

 Body Temperature  97.8 F  

 

 O2 % BldC Oximetry  99 %  

 

 BMI (Body Mass Index)  27.0 kg/m2  







Results







 Test  Acquired Date  Facility  Test  Result  H/L  Range  Note

 

 Retic Count  2020  [facility]  Retic Count  1.1 %  Normal  0.5
-1.5  



     [address]  [place] [address] [phone]          









 Corrected Retic Count  0.5 %  Normal  0.5-1.5  

 

 Maturation Factor Retic  2.0      

 

 Retic Index  0.30      

 

 Mean Retic Volume  93.1      

 

 Immature Retic Fraction  0.42      

 

 RBC Retic Count  4.14 10^6/uL  Normal  3.70-4.87  

 

 Hematocrit for Retic CNT  21 %  Low  35-47  









 Iron & Iron  2020  [facility]  Total Iron  564 g/dL  High  
250-450  



 Binding    [address]  Binding        



 [place] [address]  Capacity        



     [phone]          









 Transferrin  403 mg/dL  High  203-362  

 

 Iron  < 20 g/dL  Low    

 

 Unsaturated Iron Binding  < 549 g/dL      

 

 % Iron Saturation  4 %  Low  15-55  









 Laboratory test  2020  [facility]  Ferritin  2.5 ng/mL  Low  
  



 finding    [address]          



     [place], [place] [id number] [phone]          

 

 Abo/RH Type  2020  [facility]  Patient Blood  A Positive      
1



     [address]  Type        



     [place] [address] [phone]          

 

 Type & Screen  2020  [facility]  Patient Blood  A Positive   
   



     [address]  Type        



     [place] [address]          



     [phone]          









 Antibody Screen  NEGATIVE      









 Laboratory test  2020  [facility]  Packed Cells  SEE RESULTS 
     2



 finding    [address]    [address]EL[place] <SEE      



     [place] [address]    NOTE>      



     [phone]          

 

 Inr/Protime  2020  [facility]  Inr  1.22  High  0.82-  3



      [address]        [address]  



     [place], [place] [id number] [phone]          

 

 Comp Metabolic  2020  [facility]  Sodium  137 mmol/L  Normal  
135-1  



 Panel     [address]  



     [place] [address] [phone]          









 Potassium  4.0 mmol/L  Normal  3.5-5.0  

 

 Chloride  105 mmol/L  Normal  101-111  

 

 Co2 Carbon Dioxide  25 mmol/L  Normal  22-32  

 

 Anion Gap  7 mmol/L  Normal  2-11  

 

 Glucose  74 mg/dL  Normal    

 

 Blood Urea Nitrogen  7 mg/dL  Normal  6-24  

 

 Creatinine  0.57 mg/dL  Normal  0.51-0.95  

 

 BUN/Creatinine Ratio  12.3  Normal  8-20  

 

 Calcium  9.2 mg/dL  Normal  8.6-10.3  

 

 Total Protein  7.2 g/dL  Normal  6.4-8.9  

 

 Albumin  4.2 g/dL  Normal  3.2-5.2  

 

 Globulin  3.0 g/dL  Normal  2-4  

 

 Albumin/Globulin Ratio  1.4  Normal  1-3  

 

 Total Bilirubin  0.90 mg/dL  Normal  0.2-1.0  

 

 Alkaline Phosphatase  49 U/L  Normal    

 

 Alt  8 U/L  Normal  7-52  

 

 Ast  14 U/L  Normal  13-39  

 

 Egfr Non-  113.7    >60  

 

 Egfr   137.6    >60  4









 Laboratory test  2020  [facility]  Troponin-I  0.00 ng/mL    <
0.03  5



 finding    [address]  (TnI)        



     [place] [address] [phone]          

 

 CBC Auto Diff  2020  [facility]  White Blood  3.4  Low  3.5-
10.8  



      [address]  Count  10^3/uL      



     [place] [address] [phone]          









 Red Blood Count  4.36 10^6/uL  Normal  3.70-4.87  

 

 Hemoglobin  6.1 g/dL  Critical low  12.0-16.0  6

 

 Hematocrit  23 %  Low  35-47  

 

 Mean Corpuscular Volume  52 fL  Low  80-97  

 

 Mean Corpuscular Hemoglobin  14 pg  Low  27-31  

 

 Mean Corpuscular HGB Conc  27 g/dL  Low  31-36  

 

 Red Cell Distribution Width  23 %  High  10-15  

 

 Platelet Count  248 10^3/uL  Normal  150-450  

 

 Mean Platelet Volume  8.8 fL  Normal  7.4-10.4  

 

 Abs Neutrophils  1.4 10^3/uL  Low  1.5-7.7  

 

 Abs Lymphocytes  1.4 10^3/uL  Normal  1.0-4.8  

 

 Abs Monocytes  0.4 10^3/uL  Normal  0-0.8  

 

 Abs Eosinophils  0.0 10^3/uL  Normal  0-0.6  

 

 Abs Basophils  0.1 10^3/uL  Normal  0-0.2  

 

 Abs Nucleated RBC  0.0 10^3/uL      

 

 Granulocyte %  41.6 %      

 

 Lymphocyte %  41.9 %      

 

 Monocyte %  12.9 %      

 

 Eosinophil %  1.4 %      

 

 Basophil %  2.2 %      

 

 Nucleated Red Blood Cells %  0.4      









 Cell Morphology  2020  [facility]  Microcytosis  2+      



     [address]          



     [place] [address] [phone]          









 Hypochromasia  2+      

 

 Anisocytosis  1+      









 CBC Auto  2020  [facility]  White Blood  4.1 10^3/uL  Normal  
3.5-10.8  7



 Diff    [address]  Count        



     [place] [address] [phone]          









 Red Blood Count  3.98 10^6/uL  Normal  3.70-4.87  

 

 Hemoglobin  5.7 g/dL  Critical low  12.0-16.0  8

 

 Hematocrit  21 %  Low  35-47  

 

 Mean Corpuscular Volume  52 fL  Low  80-97  

 

 Mean Corpuscular Hemoglobin  14 pg  Low  27-31  

 

 Mean Corpuscular HGB Conc  28 g/dL  Low  31-36  

 

 Red Cell Distribution Width  23 %  High  10-15  

 

 Platelet Count  247 10^3/uL  Normal  150-450  

 

 Mean Platelet Volume  9.0 fL  Normal  7.4-10.4  

 

 Abs Neutrophils  1.7 10^3/uL  Normal  1.5-7.7  

 

 Abs Lymphocytes  1.6 10^3/uL  Normal  1.0-4.8  

 

 Abs Monocytes  0.5 10^3/uL  Normal  0-0.8  

 

 Abs Eosinophils  0.1 10^3/uL  Normal  0-0.6  

 

 Abs Basophils  0.2 10^3/uL  Normal  0-0.2  

 

 Abs Nucleated RBC  0.0 10^3/uL      

 

 Granulocyte %  40.6 %      

 

 Lymphocyte %  39.9 %      

 

 Monocyte %  12.2 %      

 

 Eosinophil %  2.8 %      

 

 Basophil %  4.5 %      

 

 Nucleated Red Blood Cells %  0.2      









 Comp Metabolic  2020  [facility]  Sodium  138 mmol/L  Normal  
135-145  



 Panel    [address] [place] [address] [phone]          









 Potassium  4.0 mmol/L  Normal  3.5-5.0  

 

 Chloride  107 mmol/L  Normal  101-111  

 

 Co2 Carbon Dioxide  25 mmol/L  Normal  22-32  

 

 Anion Gap  6 mmol/L  Normal  2-11  

 

 Glucose  79 mg/dL  Normal    

 

 Blood Urea Nitrogen  8 mg/dL  Normal  6-24  

 

 Creatinine  0.52 mg/dL  Normal  0.51-0.95  

 

 BUN/Creatinine Ratio  15.4  Normal  8-20  

 

 Calcium  9.4 mg/dL  Normal  8.6-10.3  

 

 Total Protein  6.8 g/dL  Normal  6.4-8.9  

 

 Albumin  4.2 g/dL  Normal  3.2-5.2  

 

 Globulin  2.6 g/dL  Normal  2-4  

 

 Albumin/Globulin Ratio  1.6  Normal  1-3  

 

 Total Bilirubin  0.90 mg/dL  Normal  0.2-1.0  

 

 Alkaline Phosphatase  50 U/L  Normal    

 

 Alt  9 U/L  Normal  7-52  

 

 Ast  13 U/L  Normal  13-39  

 

 Egfr Non-  126.4    >60  

 

 Egfr   152.9    >60  9









 Cell Morphology  2020  [facility]  Microcytosis  3+      



     [address] [place] [address] [phone]          









 Hypochromasia  3+      

 

 Polychromasia  1+      

 

 Target Cells  1+      









 Laboratory test  2020  [facility]  Pathologist Review  (SEE 
NOTE)      10



 finding    [address] [place] [address] [phone]          









 1  GENERAL

 

 2  SEE RESULTS BELOW



   [id number]  AP        PC



   TRANSFUSED     20 1801



   [id number]  AP        PC



   TRANSFUSED     20 2308

 

 3  Standard intensity warfarin therapeutic range: 2.0-3.0



   High intensity warfarin therapeutic range: 2.5-3.5

 

 4  *******Because ethnic data is not always readily available,



   this report includes an eGFR for both -Americans and



   non- Americans.****



   The National Kidney Disease Education Program (NKDEP) does



   not endorse the use of the MDRD equation for patients that



   are not between the ages of 18 and 70, are pregnant, have



   extremes of body size, muscle mass, or nutritional status,



   or are non- or non-.



   According to the National Kidney Foundation, irrespective of



   diagnosis, the stage of the disease is based on the level of



   kidney function:



   Stage Description                      GFR(mL/min/1.73 m(2))



   1     Kidney damage with normal or decreased GFR       90



   2     Kidney damage with mild decrease in GFR          60-89



   3     Moderate decrease in GFR                         30-59



   4     Severe decrease in GFR                           15-29



   5     Kidney failure                       <15 (or dialysis)

 

 5  Troponin-I testing on Plasma Separator Tubes (PST) has a



   known false positive rate of 0.20-0.40%.  All positive



   troponins reflex immediately to secondary confirmatory



   testing.



   



   Using the [employer] DxI 800 Access Immunoassay systems, the



   99th percentile upper reference limit was demonstrated to be



   < 0.03 ng/mL.

 

 6  Verbal to  [id number] by [email or web address] at 1513 on 3/14/2020 .Results read back 
accurately

 

 7  ATTEMPTED Verbal by [email or web address] at 1944 on 20.     Verbal to DR DANIEL ROCKWELL by



   [id number] at 195

 

 8  ATTEMPTED Verbal by [email or web address] at 1944 on 20.



   



   Verbal to DR DANIEL ROCKWELL by [email or web address] at 1957 on 20.



   Results read back accurately.

 

 9  *******Because ethnic data is not always readily available,



   this report includes an eGFR for both -Americans and



   non- Americans.****



   The National Kidney Disease Education Program (NKDEP) does



   not endorse the use of the MDRD equation for patients that



   are not between the ages of 18 and 70, are pregnant, have



   extremes of body size, muscle mass, or nutritional status,



   or are non- or non-.



   According to the National Kidney Foundation, irrespective of



   diagnosis, the stage of the disease is based on the level of



   kidney function:



   Stage Description                      GFR(mL/min/1.73 m(2))



   1     Kidney damage with normal or decreased GFR       90



   2     Kidney damage with mild decrease in GFR          60-89



   3     Moderate decrease in GFR                         30-59



   4     Severe decrease in GFR                           15-29



   5     Kidney failure                       <15 (or dialysis)

 

 10  Microcytic hypochromic anemia.



   



   Reviewed by Татьяна Interiano MD







Procedures







 Date  Code  Description  Status

 

 2020  38390  EKG Tracing & Interpretation  Completed

 

 2020  43277  Polysomnography Sleep Staging 4+ Parameters  Completed

 

 2020  50095  Bronchospasm Provocation Evalu  Completed

 

 2020  49665  Diffusing Capacity  Completed

 

 2020  85950  Plethysmography Determination Lung Volumes & Per Airway  
Completed



     Resist  

 

 2020  17514  Pulmonary Function><Bronchodil  Completed

 

 2019  67860261  Mammogram  Completed







Medical Devices







 Description

 

 No Information Available







Encounters







 Type  Date  Location  Provider  Dx  Diagnosis

 

 Office Visit  2020  [facility]  Tonya  J45.909  Unspecified 
asthma,



   8:00a  [facility]  KENDY Varner    uncomplicated



     Cma      









 I27.20  Pulmonary hypertension, unspecified

 

 R06.83  Snoring









 Office Visit  2019  9:15a  [facility]  Eri  R06.02  Shortness of



     [facility]  MD Maurice    breath



     Cma      









 R06.83  Snoring

 

 I27.20  Pulmonary hypertension, unspecified

 

 K21.9  Gastro-esophageal reflux disease without esophagitis









 Office Visit  2019  3:40p  [facility]  Qutaybeh S.  R00.2  
Palpitations



       ALONDRA Estes    









 I34.0  Nonrheumatic mitral (valve) insufficiency

 

 I27.20  Pulmonary hypertension, unspecified







Assessments







 Date  Code  Description  Provider

 

 2020  D50.9  Iron deficiency anemia, unspecified  Zonia Hess MD

 

 2020  N92.4  Excessive bleeding in the  Zonia Hess MD



     premenopausal period  

 

 2020  R07.89  Other chest pain  Zonia Hess MD

 

 2020  G47.33  Obstructive sleep apnea (adult)  Eri Richards MD



     (pediatric)  

 

 2020  J45.909  Unspecified asthma, uncomplicated  Tonya Varner NP

 

 2020  I27.20  Pulmonary hypertension, unspecified  Tonya Varner NP

 

 2020  R06.83  Snoring  Tonya Varner NP

 

 2020  J45.909  Unspecified asthma, uncomplicated  Eri Richards MD

 

 2020  R06.02  Shortness of breath  Eri Richards MD

 

 2019  R06.02  Shortness of breath  Eri Ricahrds MD

 

 2019  R06.83  Snoring  Eri Richards MD

 

 2019  I27.20  Pulmonary hypertension, unspecified  Eri Richards MD

 

 2019  K21.9  Gastro-esophageal reflux disease  Eri Richards MD



     without esophagitis  

 

 2019  R00.2  Palpitations  Qutaybeh S. Maghaydah, M.D.

 

 2019  I34.0  Nonrheumatic mitral (valve)  Qutaybeh S. Maghaydah, M.D.



     insufficiency  

 

 2019  I27.20  Pulmonary hypertension, unspecified  Qutaybeh S. Maghaydah
, M.D.







Plan of Treatment

Future Appointment(s):2020  1:20 pm - Zonia Hess MD at [facility]2020 10:00 am - Tonya Varner NP at [facility] 
[facility]2020 - Zonia Hess, MDD50.9 Iron deficiency anemia, 
unspecifiedNew Medication:Ferrousul 325(65 Fe) mg - take one tablet every other 
dayFollow up:F/U 4 kugsewO39.4 Excessive bleeding in the premenopausal 
periodReferral:Tanisha Bo MD, OB/Gyn/Phys/Osteo



Functional Status







 Description

 

 No Information Available







Mental Status







 Description

 

 No Information Available







Referrals







 Refer to   Reason for Referral  Status  Appt Date

 

 Tanisha Bo MD  Menorrhagia leading to iron def anemia; needed  Created  00
/



   2 U PRBCs plus iron infusion; Large 5cm x 5 cm    



   fibroids    









 [address]

 

 [place] [address] [phone]

 

 









 Eri Richards MD  sob, h/o smoking/ PHTN  Sent  2019









 [address]

 

 [address]

 

 [place] [address] [phone]

## 2020-03-30 VITALS — DIASTOLIC BLOOD PRESSURE: 69 MMHG | SYSTOLIC BLOOD PRESSURE: 111 MMHG

## 2020-03-30 NOTE — CONS
CONSULTATION REPORT:

 

DATE OF CONSULT:  03/29/20 - EMERGENCY DEPT

 

REASON FOR CONSULT:  Anemia and acute kidney injury.

 

HISTORY OF PRESENT ILLNESS:  This is a 49-year-old female with past medical 
history significant for uterine fibroids and asthma, who came to the emergency 
room today with reports of shortness of breath, vomiting, and lower abdominal 
pain.  She has been vaginally bleeding for the past 6 months.  She had been 
admitted [place] [facility] [place]14/[place] to 

03/15/20 for severe anemia.  During that time, she was treated with packed red 
blood cells and scans had shown that she had uterine fibroids.  She was seen on 
an outpatient basis on 03/27/20 where uterine fibroids were biopsied, 
especially with concern of cancer as she has had over 20-pound weight loss in 
the past 2 months.  She stated that right after the biopsy she was taking 
ibuprofen every 4 hours for pain control and then she woke up on the next 
morning, Saturday at 4 a.m. with repeated vomiting and at that point, she 
stopped her ibuprofen, and initially on Saturday she was bleeding as much as 
she had normally about changing her pads 5 to 6 times a day.  However, yesterday
, she started bleeding even more heavily and having to change her pads once an 
hour.  She was also developing left- sided chest pain that radiated to her left 
shoulder.  She was coughing, feeling short of breath and fatigued.  She stated 
that she has not eaten anything solid since this past Thursday.  When she was 
discharged from the hospital most recently she was prescribed to take ferrous 
sulfate and Levora oral contraceptive; however, she stated that she never took 
them because she did not have the money to purchase them and her insurance does 
not kick in until 04/01/20.



In the emergency room, labs were drawn, EKG was performed, trops were negative, 
but she was noted to have markedly elevated creatinine at 8.03 and her 
hemoglobin and hematocrit were 7.9 and 27.  Her vital signs have been stable, 
blood pressure is on the slightly softer side.  Communication was had by the ED 
doc with a nephrologist through the transfer center that recommended a fluid 
challenge and the hospitalists were asked to consult on the patient.  After 3 L 
of normal saline, labs were checked again and she was found to have very minor 
changes in her creatinine, so therefore I recommended that she be transferred 
to another facility to be seen by a nephrologist.

 

PAST MEDICAL HISTORY:  Asthma and uterine fibroids.

 

PAST SURGICAL HISTORY:  Breast reduction.

 

HOME MEDICATIONS:

1.  Ferrous sulfate 325 mg daily.

2.  Levora-28 one tab p.o. daily.

3.  Ibuprofen 400 mg p.o. q.4 hours p.r.n.

 

ALLERGIES:  No known drug allergies.

 

FAMILY HISTORY:  Sister has coronary artery disease.

 

SOCIAL HISTORY:  She is a former smoker.  Denies any alcohol or EtOH use.  She 
is a  of [place].

 

REVIEW OF SYSTEMS:  A 12-point system review was performed, which was positive 
for diaphoresis and feeling cold and clammy.  No subjective fevers.  No 
anorexia.  Left- sided chest pain radiating into the left shoulder.  Cough x3 
to 4 days producing yellow sputum.  No hemoptysis.  Positive for shortness of 
breath with exertion. Positive for nausea and vomiting.  Negative for any 
diarrhea or constipation.  No gross hematuria.  No visual complaints.  No 
arthralgias, myalgias, rashes, lesions or any other complaints.  Please see HPI 
for further information.

 

PHYSICAL EXAM:  Vital Signs:  100.2 Fahrenheit, 68 pulse, 18 respirations, 93% 
oxygen on room air, and 118/69 blood pressure.  General:  This is a well-
developed  woman seen resting in the bed, anxious and otherwise 
in no acute distress.  HEENT:  Conjunctival sac is pale, otherwise conjunctivae 
are moist. PERRLA.  EOMs intact.  Oropharynx clear.  Mucous membranes are 
moist.  Neck: Supple.  Cardiac:  S1, S2 present.  Heart rate regular.  No 
murmurs, gallops, or rubs appreciated.  Respiratory:  Lung sounds clear but 
diminished throughout bilaterally on room air.  No accessory muscle use noted.  
Abdomen:  Soft, distended, tender in the upper quadrants with hyperactive bowel 
sounds x4. Musculoskeletal:  No clubbing or cyanosis of the digits.  Full range 
of motion. Skin is intact without any rashes or lesions.  Neuro:  Sensation 
intact to light touch.  No focal deficits appreciated.  Psych:  She is alert 
and oriented x4, anxious.

 

DIAGNOSTIC STUDIES/LAB DATA:  Renal ultrasound showed bilaterally enlarged 
echogenic kidneys, differential includes cardiac congestion/failure or 
infiltrative process.

 

Pertinent lab data:  WBCs 7.9, RBCs 4.97, hemoglobin 7.9, hematocrit 27, MCV 55
, MCH 16, MCHC 29, RDW 28, platelet count 597.  Venous blood gas; pCO2 of 38, 
pO2 of 210, HCO3 of 19.3, O2 saturation 29.9, base excess negative at 4.8.  
Sodium is 132, carbon dioxide 19, BUN 75, creatinine 7.38, GFR 7.2.  Calcium 
8.1.  Ferritin 644.1. AST 54.  C-reactive protein 82.92.  Troponin 0.00.  Urine 
has a specific gravity of 1.009, urine protein 1+, blood is 3+, leukocyte 
esterase 1+, wbc's 2+, rbc's 3+, urine bacteria 1+, and squamous epithelial 
cells present.  Urine sodium concentration 

is 48.

 

ASSESSMENT AND PLAN:  My impression is that this is a 49-year-old female with a 
past medical history significant for uterine fibroids and asthma, who is to be 
transferred to another facility to be seen by nephrologist for possible 
dialysis due to acute kidney injury, which is suspected to be attributed to 
acute tubular necrosis as well as acute blood loss anemia.

 

1.  Anemia.  Because the patient had a sudden uptake in bleeding after biopsy 
with OB/GYN, I have a feeling that this is correlated; however, OB/GYN was 
consulted by the ED and they feel that this is unrelated.  Bleeding has slowed 
down in the emergency room, awaiting test results of LDH to look for possible 
hemolysis, though this can likely be attributed to vaginal bleeding.  I would 
recommend that should she continue to drop in H and H that she should receive 
tranexamic acid as well as packed red blood cells, though because she is 
hemodynamically stable, I do not feel that she needs to be transfused at this 
time.

2.  Acute kidney injury that could possibly be attributed to acute tubular 
necrosis secondary to bleeding; however, there may be an additional cause.  
Renal ultrasound did show bilateral echogenic kidneys, possibly secondary to an 
infiltrative process.  Her creatinine did not respond well to fluid challenge, 
originally creatinine was 8.03 and after 3 L of normal saline only decreased to 
7.38 and due to this reasoning recommending a transfer to Nephrology in another 
facility.

3.  Code status is full code.

 

DISPOSITION:  To transfer the patient.

 

CONDITION:  Guarded.

 

TIME SPENT:  Time spent on the patient is about 60 minutes with 30 of that 
spent face-to-face.

 

Thank you for allowing us to participate in the care of this patient.

 

 [id number]/CPS #: [id number]

[facility]